# Patient Record
Sex: MALE | Race: BLACK OR AFRICAN AMERICAN | NOT HISPANIC OR LATINO | Employment: FULL TIME | URBAN - METROPOLITAN AREA
[De-identification: names, ages, dates, MRNs, and addresses within clinical notes are randomized per-mention and may not be internally consistent; named-entity substitution may affect disease eponyms.]

---

## 2020-04-29 ENCOUNTER — HOSPITAL ENCOUNTER (OUTPATIENT)
Facility: HOSPITAL | Age: 50
Discharge: HOME | End: 2020-05-01
Attending: EMERGENCY MEDICINE | Admitting: FAMILY MEDICINE
Payer: COMMERCIAL

## 2020-04-29 DIAGNOSIS — R07.9 CHEST PAIN: Primary | ICD-10-CM

## 2020-04-29 DIAGNOSIS — R73.9 HYPERGLYCEMIA: ICD-10-CM

## 2020-04-29 PROBLEM — E66.01 MORBID OBESITY: Status: ACTIVE | Noted: 2020-04-29

## 2020-04-29 PROBLEM — I10 HTN (HYPERTENSION): Status: ACTIVE | Noted: 2020-04-29

## 2020-04-29 LAB
ALBUMIN SERPL BCP-MCNC: 4.2 G/DL (ref 3.5–5.2)
ALLENS TEST: ABNORMAL
ALP SERPL-CCNC: 117 U/L (ref 55–135)
ALT SERPL W/O P-5'-P-CCNC: 29 U/L (ref 10–44)
ANION GAP SERPL CALC-SCNC: 14 MMOL/L (ref 8–16)
AST SERPL-CCNC: 15 U/L (ref 10–40)
B-OH-BUTYR BLD STRIP-SCNC: 1.3 MMOL/L (ref 0–0.5)
BASOPHILS # BLD AUTO: 0.03 K/UL (ref 0–0.2)
BASOPHILS NFR BLD: 0.5 % (ref 0–1.9)
BILIRUB SERPL-MCNC: 1 MG/DL (ref 0.1–1)
BNP SERPL-MCNC: <10 PG/ML (ref 0–99)
BUN SERPL-MCNC: 19 MG/DL (ref 6–20)
CALCIUM SERPL-MCNC: 10.4 MG/DL (ref 8.7–10.5)
CHLORIDE SERPL-SCNC: 93 MMOL/L (ref 95–110)
CHOLEST SERPL-MCNC: 217 MG/DL (ref 120–199)
CHOLEST/HDLC SERPL: 6 {RATIO} (ref 2–5)
CO2 SERPL-SCNC: 27 MMOL/L (ref 23–29)
CREAT SERPL-MCNC: 1.4 MG/DL (ref 0.5–1.4)
DELSYS: ABNORMAL
DIFFERENTIAL METHOD: NORMAL
EOSINOPHIL # BLD AUTO: 0.1 K/UL (ref 0–0.5)
EOSINOPHIL NFR BLD: 2.6 % (ref 0–8)
ERYTHROCYTE [DISTWIDTH] IN BLOOD BY AUTOMATED COUNT: 12.1 % (ref 11.5–14.5)
EST. GFR  (AFRICAN AMERICAN): >60 ML/MIN/1.73 M^2
EST. GFR  (NON AFRICAN AMERICAN): 57 ML/MIN/1.73 M^2
FIO2: 21
GLUCOSE SERPL-MCNC: 577 MG/DL (ref 70–110)
HCO3 UR-SCNC: 33.6 MMOL/L (ref 24–28)
HCT VFR BLD AUTO: 45.4 % (ref 40–54)
HDLC SERPL-MCNC: 36 MG/DL (ref 40–75)
HDLC SERPL: 16.6 % (ref 20–50)
HGB BLD-MCNC: 15.4 G/DL (ref 14–18)
IMM GRANULOCYTES # BLD AUTO: 0.01 K/UL (ref 0–0.04)
IMM GRANULOCYTES NFR BLD AUTO: 0.2 % (ref 0–0.5)
LDLC SERPL CALC-MCNC: 110.6 MG/DL (ref 63–159)
LYMPHOCYTES # BLD AUTO: 1.9 K/UL (ref 1–4.8)
LYMPHOCYTES NFR BLD: 34.8 % (ref 18–48)
MCH RBC QN AUTO: 28.6 PG (ref 27–31)
MCHC RBC AUTO-ENTMCNC: 33.9 G/DL (ref 32–36)
MCV RBC AUTO: 84 FL (ref 82–98)
MODE: ABNORMAL
MONOCYTES # BLD AUTO: 0.5 K/UL (ref 0.3–1)
MONOCYTES NFR BLD: 8.6 % (ref 4–15)
NEUTROPHILS # BLD AUTO: 2.9 K/UL (ref 1.8–7.7)
NEUTROPHILS NFR BLD: 53.3 % (ref 38–73)
NONHDLC SERPL-MCNC: 181 MG/DL
NRBC BLD-RTO: 0 /100 WBC
PCO2 BLDA: 63.8 MMHG (ref 35–45)
PH SMN: 7.33 [PH] (ref 7.35–7.45)
PLATELET # BLD AUTO: 237 K/UL (ref 150–350)
PMV BLD AUTO: 10.2 FL (ref 9.2–12.9)
PO2 BLDA: 21 MMHG (ref 40–60)
POC BE: 8 MMOL/L
POC SATURATED O2: 30 % (ref 95–100)
POCT GLUCOSE: 372 MG/DL (ref 70–110)
POCT GLUCOSE: 385 MG/DL (ref 70–110)
POCT GLUCOSE: 479 MG/DL (ref 70–110)
POTASSIUM SERPL-SCNC: 4.7 MMOL/L (ref 3.5–5.1)
PROT SERPL-MCNC: 7.6 G/DL (ref 6–8.4)
RBC # BLD AUTO: 5.39 M/UL (ref 4.6–6.2)
SAMPLE: ABNORMAL
SARS-COV-2 RDRP RESP QL NAA+PROBE: NEGATIVE
SITE: ABNORMAL
SODIUM SERPL-SCNC: 134 MMOL/L (ref 136–145)
TRIGL SERPL-MCNC: 352 MG/DL (ref 30–150)
TROPONIN I SERPL DL<=0.01 NG/ML-MCNC: 0.02 NG/ML (ref 0–0.03)
WBC # BLD AUTO: 5.49 K/UL (ref 3.9–12.7)

## 2020-04-29 PROCEDURE — 80053 COMPREHEN METABOLIC PANEL: CPT

## 2020-04-29 PROCEDURE — 83880 ASSAY OF NATRIURETIC PEPTIDE: CPT

## 2020-04-29 PROCEDURE — 84484 ASSAY OF TROPONIN QUANT: CPT | Mod: 91

## 2020-04-29 PROCEDURE — 82803 BLOOD GASES ANY COMBINATION: CPT

## 2020-04-29 PROCEDURE — 96372 THER/PROPH/DIAG INJ SC/IM: CPT | Mod: 59

## 2020-04-29 PROCEDURE — 96374 THER/PROPH/DIAG INJ IV PUSH: CPT

## 2020-04-29 PROCEDURE — G0378 HOSPITAL OBSERVATION PER HR: HCPCS

## 2020-04-29 PROCEDURE — 83036 HEMOGLOBIN GLYCOSYLATED A1C: CPT

## 2020-04-29 PROCEDURE — 63600175 PHARM REV CODE 636 W HCPCS: Performed by: NURSE PRACTITIONER

## 2020-04-29 PROCEDURE — 80061 LIPID PANEL: CPT

## 2020-04-29 PROCEDURE — 63600175 PHARM REV CODE 636 W HCPCS: Performed by: EMERGENCY MEDICINE

## 2020-04-29 PROCEDURE — 99291 CRITICAL CARE FIRST HOUR: CPT | Mod: 25

## 2020-04-29 PROCEDURE — 93010 EKG 12-LEAD: ICD-10-PCS | Mod: ,,, | Performed by: INTERNAL MEDICINE

## 2020-04-29 PROCEDURE — 99900035 HC TECH TIME PER 15 MIN (STAT)

## 2020-04-29 PROCEDURE — 25000003 PHARM REV CODE 250: Performed by: EMERGENCY MEDICINE

## 2020-04-29 PROCEDURE — 82962 GLUCOSE BLOOD TEST: CPT

## 2020-04-29 PROCEDURE — 99245 PR OFFICE CONSULTATION,LEVEL V: ICD-10-PCS | Mod: 25,,, | Performed by: INTERNAL MEDICINE

## 2020-04-29 PROCEDURE — 82010 KETONE BODYS QUAN: CPT

## 2020-04-29 PROCEDURE — 96361 HYDRATE IV INFUSION ADD-ON: CPT

## 2020-04-29 PROCEDURE — U0002 COVID-19 LAB TEST NON-CDC: HCPCS

## 2020-04-29 PROCEDURE — 99245 OFF/OP CONSLTJ NEW/EST HI 55: CPT | Mod: 25,,, | Performed by: INTERNAL MEDICINE

## 2020-04-29 PROCEDURE — 93005 ELECTROCARDIOGRAM TRACING: CPT

## 2020-04-29 PROCEDURE — 85025 COMPLETE CBC W/AUTO DIFF WBC: CPT

## 2020-04-29 PROCEDURE — 36415 COLL VENOUS BLD VENIPUNCTURE: CPT

## 2020-04-29 PROCEDURE — 93010 ELECTROCARDIOGRAM REPORT: CPT | Mod: ,,, | Performed by: INTERNAL MEDICINE

## 2020-04-29 RX ORDER — REGADENOSON 0.08 MG/ML
0.4 INJECTION, SOLUTION INTRAVENOUS ONCE
Status: COMPLETED | OUTPATIENT
Start: 2020-04-30 | End: 2020-04-30

## 2020-04-29 RX ORDER — IBUPROFEN 200 MG
16 TABLET ORAL
Status: DISCONTINUED | OUTPATIENT
Start: 2020-04-29 | End: 2020-05-01 | Stop reason: HOSPADM

## 2020-04-29 RX ORDER — LOSARTAN POTASSIUM 25 MG/1
25 TABLET ORAL DAILY
Status: DISCONTINUED | OUTPATIENT
Start: 2020-04-30 | End: 2020-05-01 | Stop reason: HOSPADM

## 2020-04-29 RX ORDER — PROMETHAZINE HYDROCHLORIDE 25 MG/1
25 TABLET ORAL EVERY 6 HOURS PRN
Status: DISCONTINUED | OUTPATIENT
Start: 2020-04-29 | End: 2020-05-01 | Stop reason: HOSPADM

## 2020-04-29 RX ORDER — INSULIN ASPART 100 [IU]/ML
1-10 INJECTION, SOLUTION INTRAVENOUS; SUBCUTANEOUS
Status: DISCONTINUED | OUTPATIENT
Start: 2020-04-29 | End: 2020-05-01 | Stop reason: HOSPADM

## 2020-04-29 RX ORDER — GLUCAGON 1 MG
1 KIT INJECTION
Status: DISCONTINUED | OUTPATIENT
Start: 2020-04-29 | End: 2020-05-01 | Stop reason: HOSPADM

## 2020-04-29 RX ORDER — OLMESARTAN MEDOXOMIL AND HYDROCHLOROTHIAZIDE 40/25 40; 25 MG/1; MG/1
1 TABLET ORAL DAILY
COMMUNITY

## 2020-04-29 RX ORDER — NAPROXEN SODIUM 220 MG/1
81 TABLET, FILM COATED ORAL DAILY
Status: DISCONTINUED | OUTPATIENT
Start: 2020-04-30 | End: 2020-05-01 | Stop reason: HOSPADM

## 2020-04-29 RX ORDER — DESLORATADINE 5 MG/1
TABLET ORAL
Status: ON HOLD | COMMUNITY
Start: 2020-04-06 | End: 2020-05-01 | Stop reason: HOSPADM

## 2020-04-29 RX ORDER — SODIUM CHLORIDE 0.9 % (FLUSH) 0.9 %
10 SYRINGE (ML) INJECTION
Status: DISCONTINUED | OUTPATIENT
Start: 2020-04-29 | End: 2020-05-01 | Stop reason: HOSPADM

## 2020-04-29 RX ORDER — IBUPROFEN 200 MG
24 TABLET ORAL
Status: DISCONTINUED | OUTPATIENT
Start: 2020-04-29 | End: 2020-05-01 | Stop reason: HOSPADM

## 2020-04-29 RX ORDER — ASPIRIN 325 MG
325 TABLET ORAL
Status: COMPLETED | OUTPATIENT
Start: 2020-04-29 | End: 2020-04-29

## 2020-04-29 RX ORDER — ACETAMINOPHEN 325 MG/1
650 TABLET ORAL EVERY 4 HOURS PRN
Status: DISCONTINUED | OUTPATIENT
Start: 2020-04-29 | End: 2020-05-01 | Stop reason: HOSPADM

## 2020-04-29 RX ADMIN — INSULIN HUMAN 8 UNITS: 100 INJECTION, SOLUTION PARENTERAL at 12:04

## 2020-04-29 RX ADMIN — INSULIN ASPART 5 UNITS: 100 INJECTION, SOLUTION INTRAVENOUS; SUBCUTANEOUS at 08:04

## 2020-04-29 RX ADMIN — SODIUM CHLORIDE 1000 ML: 0.9 INJECTION, SOLUTION INTRAVENOUS at 12:04

## 2020-04-29 RX ADMIN — ASPIRIN 325 MG: 325 TABLET ORAL at 11:04

## 2020-04-29 RX ADMIN — INSULIN ASPART 10 UNITS: 100 INJECTION, SOLUTION INTRAVENOUS; SUBCUTANEOUS at 05:04

## 2020-04-29 NOTE — ASSESSMENT & PLAN NOTE
- Place in OBS  - No known h/o cardiac dx; reports negative ST at [facility] in 2018 (records unavailable to review)  - 12 lead EKG showed NSR with left anterior fascicular block  - Troponin x 2 negative, will trend  - Given  mg in the ER, continue 81 mg daily  - Cardiology consult pending for any additional recs  - Cardiac, ADA diet, NPO after MN  - Lipid panel pending  - Tele monitoring

## 2020-04-29 NOTE — HPI
Satnam Walter is a 50 y.o. male patient with a PMHx of HTN and Morbid obesity who presented to the Emergency Department today with c/o substernal chest pain, onset 3 days PTA.  Patient states that the pain radiates to his L hand.  Symptoms are constant and moderate in severity.  No mitigating or exacerbating factors reported.  Patient denies any fever, chills, n/v/d, SOB, weakness, numbness, dizziness, headache, and all other sxs at this time.  No prior Tx reported.  No further complaints or concerns at this time.  ER workup showed:  Stable VS.  CBC unremarkable.  CMP showed BG of 577, which improved to 372 with 8 units of IV Insulin.  Beta hydroxybutyrate 1.3.  ABG showed ph 7.33, AG 14.  No known h/o DM.  A1c pending.  Troponin negative x 2.  12 lead EKG showed Normal sinus rhythm, Left anterior fascicular block.  Patient was given  mg in the ER and Hospital Medicine contacted for admission.  Cardiology consult pending given multiple risk factors.  Patient will be placed in OBS.  He is a Full Code.  His SDM is his wife Loly who can be reached at [phone].

## 2020-04-29 NOTE — CONSULTS
[facility] -   Cardiology  Consult Note    Patient Name: Satnam Walter  MRN: [id number]  Admission Date: 4/29/2020  Hospital Length of Stay: 0 days  Code Status: Full Code   Attending Provider: Toy De Los Santos MD   Consulting Provider: Jono Manning Md, MD  Primary Care Physician: Pro Levine MD  Principal Problem:Chest pain    Patient information was obtained from patient, past medical records and ER records.     Inpatient consult to Cardiology  Consult performed by: Jono Manning MD  Consult ordered by: Leigh Ann Clark NP  Reason for consult: Chest pain        Subjective:     Chief Complaint:  Chest pain     HPI:   Satnam Walter is a 50 y.o. male pt with HTN  and Morbid obesity who presented to the Emergency Department today with c/o substernal chest pain, onset 3 days PTA.   Info obtained from chart and pt. Patient states that the pain radiates to his L hand.  Symptoms are constant and moderate in severity.  No mitigating or exacerbating factors reported.  Patient denies any fever, chills, n/v/d, SOB, weakness, numbness, dizziness, headache, and all other sxs at this time.  No prior Tx reported.  No further complaints or concerns at this time.  ER workup showed:  Stable VS.  CBC unremarkable.  CMP showed BG of 577, which improved to 372 with 8 units of IV Insulin.  Beta hydroxybutyrate 1.3.  ABG showed ph 7.33, AG 14.  No known h/o DM.  A1c pending.  Troponin negative x 2.  12 lead EKG showed Normal sinus rhythm, Left anterior fascicular block.  Patient was given  mg in the ER and Hospital Medicine contacted for admission.  Cardiology consulted for chest pain.   Discussed with him regarding workup for chest pain, prior stress with ECG and no imaging. Will have pharm nuclear stress test tomorrow and ECHO. No CP currently.    Past Medical History:   Diagnosis Date    Hypertension        Past Surgical History:   Procedure Laterality Date    CHOLECYSTECTOMY         Review of  patient's allergies indicates:  No Known Allergies    No current facility-administered medications on file prior to encounter.      Current Outpatient Medications on File Prior to Encounter   Medication Sig    desloratadine (CLARINEX) 5 mg tablet     olmesartan-hydrochlorothiazide (BENICAR HCT) 40-25 mg per tablet Take 1 tablet by mouth once daily.      Family History     None        Tobacco Use    Smoking status: Unknown If Ever Smoked   Substance and Sexual Activity    Alcohol use: Not Currently    Drug use: Not Currently    Sexual activity: Yes     Partners: Female     Review of Systems   Constitution: Positive for malaise/fatigue.   HENT: Negative.    Cardiovascular: Positive for chest pain.   Respiratory: Negative.    Endocrine: Negative.    Hematologic/Lymphatic: Negative.    Musculoskeletal: Negative.    Gastrointestinal: Negative.    Genitourinary: Negative.    Neurological: Negative.    Psychiatric/Behavioral: Negative.    Allergic/Immunologic: Negative.      Objective:     Vital Signs (Most Recent):  Temp: 98.3 °F (36.8 °C) (04/29/20 1544)  Pulse: 81 (04/29/20 1544)  Resp: 18 (04/29/20 1544)  BP: 131/63 (04/29/20 1544)  SpO2: 95 % (04/29/20 1544) Vital Signs (24h Range):  Temp:  [97.8 °F (36.6 °C)-98.4 °F (36.9 °C)] 98.3 °F (36.8 °C)  Pulse:  [72-89] 81  Resp:  [15-20] 18  SpO2:  [95 %-99 %] 95 %  BP: (124-138)/(63-86) 131/63     Weight: (!) 144.3 kg (318 lb 0.2 oz)  Body mass index is 45.63 kg/m².    SpO2: 95 %  O2 Device (Oxygen Therapy): room air    No intake or output data in the 24 hours ending 04/29/20 1715    Lines/Drains/Airways     Peripheral Intravenous Line                 Peripheral IV - Single Lumen 04/29/20 1114 20 G Left Hand less than 1 day                Physical Exam   Constitutional: He is oriented to person, place, and time. He appears well-developed and well-nourished. No distress.   Obese   HENT:   Head: Normocephalic and atraumatic.   Nose: Nose normal.   Mouth/Throat:  Oropharynx is clear and moist.   Eyes: Conjunctivae and EOM are normal. No scleral icterus.   Neck: Normal range of motion. Neck supple. No JVD present. No thyromegaly present.   Cardiovascular: Normal rate, regular rhythm, S1 normal and S2 normal. Exam reveals no gallop, no S3, no S4 and no friction rub.   No murmur heard.  Pulmonary/Chest: Effort normal and breath sounds normal. No stridor. No respiratory distress. He has no wheezes. He has no rales. He exhibits no tenderness.   Abdominal: Soft. Bowel sounds are normal. He exhibits no distension and no mass. There is no tenderness. There is no rebound.   Genitourinary:   Genitourinary Comments: Deferred   Musculoskeletal: Normal range of motion. He exhibits no edema, tenderness or deformity.   Lymphadenopathy:     He has no cervical adenopathy.   Neurological: He is alert and oriented to person, place, and time. He exhibits normal muscle tone. Coordination normal.   Skin: Skin is warm and dry. No rash noted. He is not diaphoretic. No erythema. No pallor.   Psychiatric: He has a normal mood and affect. His behavior is normal. Judgment and thought content normal.   Nursing note and vitals reviewed.      Significant Labs:   All pertinent lab results from the last 24 hours have been reviewed. and   Recent Lab Results       04/29/20  1314   04/29/20  1312   04/29/20  1305   04/29/20  1216   04/29/20  1214        Albumin               Alkaline Phosphatase               Allens Test N/A             ALT               Anion Gap               AST               Baso #               Basophil%               Beta-Hydroxybutyrate         1.3     BILIRUBIN TOTAL               BNP               Site Other             BUN, Bld               Calcium               Chloride               CO2               Creatinine               DelSys Room Air             Differential Method               eGFR if                eGFR if non                Eos #                Eosinophil%               FiO2 21             Glucose               Gran # (ANC)               Gran%               Hematocrit               Hemoglobin               Immature Grans (Abs)               Immature Granulocytes               Lymph #               Lymph%               MCH               MCHC               MCV               Mode SPONT             Mono #               Mono%               MPV               nRBC               Platelets               POC BE 8             POC HCO3 33.6             POC PCO2 63.8             POC PH 7.330             POC PO2 21             POC SATURATED O2 30             POCT Glucose     372 479       Potassium               PROTEIN TOTAL               RBC               RDW               Sample VENOUS             SARS-CoV-2 RNA, Amplification, Qual               Sodium               Troponin I   0.017  Comment:  The reference interval for Troponin I represents the 99th percentile   cutoff   for our facility and is consistent with 3rd generation assay   performance.             WBC                                04/29/20  1114   04/29/20  1113        Albumin   4.2     Alkaline Phosphatase   117     Allens Test         ALT   29     Anion Gap   14     AST   15     Baso #   0.03     Basophil%   0.5     Beta-Hydroxybutyrate         BILIRUBIN TOTAL   1.0  Comment:  For infants and newborns, interpretation of results should be based  on gestational age, weight and in agreement with clinical  observations.  Premature Infant recommended reference ranges:  Up to 24 hours.............<8.0 mg/dL  Up to 48 hours............<12.0 mg/dL  3-5 days..................<15.0 mg/dL  6-29 days.................<15.0 mg/dL       BNP   <10  Comment:  Values of less than 100 pg/ml are consistent with non-CHF populations.     Site         BUN, Bld   19     Calcium   10.4     Chloride   93     CO2   27     Creatinine   1.4     DelSys         Differential Method   Automated     eGFR if     >60     eGFR if non    57  Comment:  Calculation used to obtain the estimated glomerular filtration  rate (eGFR) is the CKD-EPI equation.        Eos #   0.1     Eosinophil%   2.6     FiO2         Glucose   577  Comment:  GLUCOSE  critical result(s) called and verbal readback obtained from   CHETNA RIVERA RN by CYRIL 04/29/2020 11:49       Gran # (ANC)   2.9     Gran%   53.3     Hematocrit   45.4     Hemoglobin   15.4     Immature Grans (Abs)   0.01  Comment:  Mild elevation in immature granulocytes is non specific and   can be seen in a variety of conditions including stress response,   acute inflammation, trauma and pregnancy. Correlation with other   laboratory and clinical findings is essential.       Immature Granulocytes   0.2     Lymph #   1.9     Lymph%   34.8     MCH   28.6     MCHC   33.9     MCV   84     Mode         Mono #   0.5     Mono%   8.6     MPV   10.2     nRBC   0     Platelets   237     POC BE         POC HCO3         POC PCO2         POC PH         POC PO2         POC SATURATED O2         POCT Glucose         Potassium   4.7     PROTEIN TOTAL   7.6     RBC   5.39     RDW   12.1     Sample         SARS-CoV-2 RNA, Amplification, Qual Negative  Comment:  This test utilizes isothermal nucleic acid amplification   technology to detect the SARS-CoV-2 RdRp nucleic acid segment.   The analytical sensitivity (limit of detection) is 125 genome   equivalents/mL.   A POSITIVE result implies infection with the SARS-CoV-2 virus;  the patient is presumed to be contagious.    A NEGATIVE result means that SARS-CoV-2 nucleic acids are not  present above the limit of detection. It does not rule out the   possibility of COVID-19 and should not be the sole basis for   treatment decisions. If COVID-19 is strongly suspected based on  clinical and exposure history, re-testing should be considered.   This test is only for use under the Food and Drug   Administration s Emergency Use Authorization (EUA).    Commercial kits are provided by [employer].   Performance characteristics of the EUA have been independently  verified by [facility] Department of  Pathology and Laboratory Medicine.   _________________________________________________________________  The ID NOW COVID-19 Letter of Authorization, along with the   authorized Fact Sheet for Healthcare Providers, the authorized Fact  Sheet for Patients, and authorized labeling are available on the FDA   website:  www.fda.gov/MedicalDevices/Safety/EmergencySituations/izk331545.htm         Sodium   134     Troponin I   0.023  Comment:  The reference interval for Troponin I represents the 99th percentile   cutoff   for our facility and is consistent with 3rd generation assay   performance.       WBC   5.49           Significant Imaging: X-Ray: CXR: X-Ray Chest 1 View (CXR): No results found for this visit on 04/29/20.    Assessment and Plan:     * Chest pain  R/o ACS  Cont enzymes, ECGs  Pharm Nuclear stress test tomorrow   If neg discussed other causes - LALY, GI/MSK   NPO past midnight    Morbid obesity  Needs weight loss with diet/exercise  May be candidate for bariatric surgery     HTN (hypertension)  Cont meds    Hyperglycemia  Cont tx per primary team        VTE Risk Mitigation (From admission, onward)         Ordered     IP VTE HIGH RISK PATIENT  Once      04/29/20 1547     Place sequential compression device  Until discontinued      04/29/20 1547                Thank you for your consult. I will follow-up with patient. Please contact us if you have any additional questions.    Jono Manning Md, MD  Cardiology   [facility]

## 2020-04-29 NOTE — ASSESSMENT & PLAN NOTE
R/o ACS  Cont enzymes, ECGs  Pharm Nuclear stress test tomorrow   If neg discussed other causes - LALY, GI/MSK   NPO past midnight

## 2020-04-29 NOTE — ED PROVIDER NOTES
SCRIBE #1 NOTE: I, Matthew Melecio, am scribing for, and in the presence of, Ac Estrada MD. I have scribed the entire note.      History      Chief Complaint   Patient presents with    Chest Pain     c/o chest pain, L arm numbness, and blurry vision for the last 3 days       Review of patient's allergies indicates:  No Known Allergies     HPI   HPI    4/29/2020, 11:54 AM   History obtained from the patient      History of Present Illness: Satnam Walter is a 50 y.o. male patient with a PMHx of HTN who presents to the Emergency Department for substernal chest pain, onset 3 days PTA. Pt states that the pain radiates to his L hand. Symptoms are constant and moderate in severity. No mitigating or exacerbating factors reported. Patient denies any fever, chills, n/v/d, SOB, weakness, numbness, dizziness, headache, and all other sxs at this time. No prior Tx reported. No further complaints or concerns at this time.     Arrival mode: Personal vehicle    PCP: Pro Levine MD       Past Medical History:  Past Medical History:   Diagnosis Date    Hypertension        Past Surgical History:  Past Surgical History:   Procedure Laterality Date    CHOLECYSTECTOMY           Family History:  History reviewed. No pertinent family history.    Social History:  Social History     Tobacco Use    Smoking status: Unknown If Ever Smoked   Substance and Sexual Activity    Alcohol use: Not Currently    Drug use: Not Currently    Sexual activity: Yes     Partners: Female       ROS   Review of Systems   Constitutional: Negative for chills, diaphoresis, fatigue and fever.   HENT: Negative for sore throat.    Respiratory: Negative for shortness of breath.    Cardiovascular: Positive for chest pain (substernal).   Gastrointestinal: Negative for diarrhea, nausea and vomiting.   Genitourinary: Negative for dysuria.   Musculoskeletal: Positive for myalgias (L hand). Negative for back pain.   Skin: Negative for rash.    Neurological: Negative for dizziness, seizures, weakness, light-headedness, numbness and headaches.   Hematological: Does not bruise/bleed easily.   All other systems reviewed and are negative.    Physical Exam      Initial Vitals [04/29/20 1052]   BP Pulse Resp Temp SpO2   138/83 82 18 98 °F (36.7 °C) 97 %      MAP       --          Physical Exam  Nursing Notes and Vital Signs Reviewed.  Constitutional: Patient is in no acute distress. Well-developed and well-nourished.  Head: Atraumatic. Normocephalic.  Eyes: PERRL. EOM intact. Conjunctivae are not pale. No scleral icterus.  ENT: Mucous membranes are moist. Oropharynx is clear and symmetric.    Neck: Supple. Full ROM. No lymphadenopathy.  Cardiovascular: Regular rate. Regular rhythm. No murmurs, rubs, or gallops. Distal pulses are 2+ and symmetric.  Pulmonary/Chest: No respiratory distress. Clear to auscultation bilaterally. No wheezing or rales.  Abdominal: Soft and non-distended.  There is no tenderness.  No rebound, guarding, or rigidity.   Musculoskeletal: Moves all extremities. No obvious deformities. No edema.  Skin: Warm and dry.  Neurological:  Alert, awake, and appropriate.  Normal speech.  No acute focal neurological deficits are appreciated.  Psychiatric: Normal affect. Good eye contact. Appropriate in content.    ED Course    Critical Care  Date/Time: 4/29/2020 2:02 PM  Performed by: Ac Estrada MD  Authorized by: Ac Estrada MD   Direct patient critical care time: 25 minutes  Additional history critical care time: 5 minutes  Ordering / reviewing critical care time: 5 minutes  Documentation critical care time: 5 minutes  Consulting other physicians critical care time: 5 minutes  Total critical care time (exclusive of procedural time) : 45 minutes  Critical care time was exclusive of separately billable procedures and treating other patients and teaching time.  Critical care was necessary to treat or prevent imminent or  "life-threatening deterioration of the following conditions: Hyperglycemia.  Critical care was time spent personally by me on the following activities: blood draw for specimens, development of treatment plan with patient or surrogate, discussions with consultants, interpretation of cardiac output measurements, evaluation of patient's response to treatment, examination of patient, obtaining history from patient or surrogate, ordering and performing treatments and interventions, ordering and review of laboratory studies, ordering and review of radiographic studies, pulse oximetry and re-evaluation of patient's condition.        ED Vital Signs:  Vitals:    04/29/20 1052 04/29/20 1113 04/29/20 1143 04/29/20 1233   BP: 138/83   125/68   Pulse: 82 89  86   Resp: 18   20   Temp: 98 °F (36.7 °C)   98.2 °F (36.8 °C)   TempSrc: Oral   Oral   SpO2: 97%   98%   Weight: (!) 144.3 kg (318 lb 0.2 oz)      Height:   5' 10" (1.778 m)     04/29/20 1301   BP: 128/74   Pulse: 83   Resp: 18   Temp:    TempSrc:    SpO2: 95%   Weight:    Height:        Abnormal Lab Results:  Labs Reviewed   COMPREHENSIVE METABOLIC PANEL - Abnormal; Notable for the following components:       Result Value    Sodium 134 (*)     Chloride 93 (*)     Glucose 577 (*)     eGFR if non  57 (*)     All other components within normal limits    Narrative:      GLUCOSE  critical result(s) called and verbal readback obtained from   CHETNA RIVERA RN by CYRIL 04/29/2020 11:49   BETA - HYDROXYBUTYRATE, SERUM - Abnormal; Notable for the following components:    Beta-Hydroxybutyrate 1.3 (*)     All other components within normal limits   POCT GLUCOSE - Abnormal; Notable for the following components:    POCT Glucose 479 (*)     All other components within normal limits   POCT GLUCOSE - Abnormal; Notable for the following components:    POCT Glucose 372 (*)     All other components within normal limits   ISTAT PROCEDURE - Abnormal; Notable for the following " components:    POC PH 7.330 (*)     POC PCO2 63.8 (*)     POC PO2 21 (*)     POC HCO3 33.6 (*)     POC SATURATED O2 30 (*)     All other components within normal limits   CBC W/ AUTO DIFFERENTIAL   TROPONIN I   B-TYPE NATRIURETIC PEPTIDE   SARS-COV-2 RNA AMPLIFICATION, QUAL   TROPONIN I   POCT GLUCOSE MONITORING CONTINUOUS        All Lab Results:  Results for orders placed or performed during the hospital encounter of 04/29/20   CBC auto differential   Result Value Ref Range    WBC 5.49 3.90 - 12.70 K/uL    RBC 5.39 4.60 - 6.20 M/uL    Hemoglobin 15.4 14.0 - 18.0 g/dL    Hematocrit 45.4 40.0 - 54.0 %    Mean Corpuscular Volume 84 82 - 98 fL    Mean Corpuscular Hemoglobin 28.6 27.0 - 31.0 pg    Mean Corpuscular Hemoglobin Conc 33.9 32.0 - 36.0 g/dL    RDW 12.1 11.5 - 14.5 %    Platelets 237 150 - 350 K/uL    MPV 10.2 9.2 - 12.9 fL    Immature Granulocytes 0.2 0.0 - 0.5 %    Gran # (ANC) 2.9 1.8 - 7.7 K/uL    Immature Grans (Abs) 0.01 0.00 - 0.04 K/uL    Lymph # 1.9 1.0 - 4.8 K/uL    Mono # 0.5 0.3 - 1.0 K/uL    Eos # 0.1 0.0 - 0.5 K/uL    Baso # 0.03 0.00 - 0.20 K/uL    nRBC 0 0 /100 WBC    Gran% 53.3 38.0 - 73.0 %    Lymph% 34.8 18.0 - 48.0 %    Mono% 8.6 4.0 - 15.0 %    Eosinophil% 2.6 0.0 - 8.0 %    Basophil% 0.5 0.0 - 1.9 %    Differential Method Automated    Comprehensive metabolic panel   Result Value Ref Range    Sodium 134 (L) 136 - 145 mmol/L    Potassium 4.7 3.5 - 5.1 mmol/L    Chloride 93 (L) 95 - 110 mmol/L    CO2 27 23 - 29 mmol/L    Glucose 577 (HH) 70 - 110 mg/dL    BUN, Bld 19 6 - 20 mg/dL    Creatinine 1.4 0.5 - 1.4 mg/dL    Calcium 10.4 8.7 - 10.5 mg/dL    Total Protein 7.6 6.0 - 8.4 g/dL    Albumin 4.2 3.5 - 5.2 g/dL    Total Bilirubin 1.0 0.1 - 1.0 mg/dL    Alkaline Phosphatase 117 55 - 135 U/L    AST 15 10 - 40 U/L    ALT 29 10 - 44 U/L    Anion Gap 14 8 - 16 mmol/L    eGFR if African American >60 >60 mL/min/1.73 m^2    eGFR if non African American 57 (A) >60 mL/min/1.73 m^2   Troponin I #1    Result Value Ref Range    Troponin I 0.023 0.000 - 0.026 ng/mL   B-Type natriuretic peptide (BNP)   Result Value Ref Range    BNP <10 0 - 99 pg/mL   COVID-19 Routine Screening   Result Value Ref Range    SARS-CoV-2 RNA, Amplification, Qual Negative Negative   Beta - Hydroxybutyrate, Serum   Result Value Ref Range    Beta-Hydroxybutyrate 1.3 (H) 0.0 - 0.5 mmol/L   POCT glucose   Result Value Ref Range    POCT Glucose 479 (HH) 70 - 110 mg/dL   POCT glucose   Result Value Ref Range    POCT Glucose 372 (H) 70 - 110 mg/dL   ISTAT PROCEDURE   Result Value Ref Range    POC PH 7.330 (L) 7.35 - 7.45    POC PCO2 63.8 (H) 35 - 45 mmHg    POC PO2 21 (LL) 40 - 60 mmHg    POC HCO3 33.6 (H) 24 - 28 mmol/L    POC BE 8 -2 to 2 mmol/L    POC SATURATED O2 30 (L) 95 - 100 %    Sample VENOUS     Site Other     Allens Test N/A     DelSys Room Air     Mode SPONT     FiO2 21      Imaging Results:  Imaging Results          X-Ray Chest AP Portable (Final result)  Result time 04/29/20 11:25:38    Final result by Erik Delgado MD (04/29/20 11:25:38)                 Impression:      No acute findings.      Electronically signed by: Erik Delgado MD  Date:    04/29/2020  Time:    11:25             Narrative:    EXAMINATION:  XR CHEST AP PORTABLE    CLINICAL HISTORY:  Acute chest pain, Chest Pain;    COMPARISON:  None    FINDINGS:  Heart size is normal. The lung fields are clear. No acute cardiopulmonary infiltrate.                               The EKG was ordered, reviewed, and independently interpreted by the ED provider.  Interpretation time: 11:00  Rate: 91 BPM  Rhythm: normal sinus rhythm  Interpretation: Left anterior fascicular block. No STEMI.           The Emergency Provider reviewed the vital signs and test results, which are outlined above.    ED Discussion     2:00 PM: Discussed case with Koby Valdez NP (Hospital Medicine). Dr. De Los Santos agrees with current care and management of pt and accepts admission.   Admitting  Service: Hospital Medicine  Admitting Physician: Dr. De Los Santos  Admit to: Obs Med Tele    2:01 PM: Re-evaluated pt. I have discussed test results, shared treatment plan, and the need for admission with patient at bedside. Pt expresses understanding at this time and agree with all information. All questions answered. Pt has no further questions or concerns at this time. Pt is ready for admit.         ED Medication(s):  Medications   aspirin tablet 325 mg (325 mg Oral Given 4/29/20 1147)   sodium chloride 0.9% bolus 1,000 mL (0 mLs Intravenous Stopped 4/29/20 1312)   insulin regular injection 8 Units (8 Units Intravenous Given 4/29/20 1224)     New Prescriptions    No medications on file           Medical Decision Making    Medical Decision Making:   Clinical Tests:   Lab Tests: Ordered and Reviewed  Radiological Study: Ordered and Reviewed  Medical Tests: Ordered and Reviewed           Scribe Attestation:   Scribe #1: I performed the above scribed service and the documentation accurately describes the services I performed. I attest to the accuracy of the note.    Attending:   Physician Attestation Statement for Scribe #1: I, Ac Estrada MD, personally performed the services described in this documentation, as scribed by Matthew Majano, in my presence, and it is both accurate and complete.          Clinical Impression       ICD-10-CM ICD-9-CM   1. Chest pain R07.9 786.50   2. Hyperglycemia R73.9 790.29       Disposition:   Disposition: Placed in Observation  Condition: Fair         Ac Estrada MD  05/01/20 3078

## 2020-04-29 NOTE — SUBJECTIVE & OBJECTIVE
Past Medical History:   Diagnosis Date    Hypertension        Past Surgical History:   Procedure Laterality Date    CHOLECYSTECTOMY         Review of patient's allergies indicates:  No Known Allergies    No current facility-administered medications on file prior to encounter.      Current Outpatient Medications on File Prior to Encounter   Medication Sig    desloratadine (CLARINEX) 5 mg tablet     olmesartan-hydrochlorothiazide (BENICAR HCT) 40-25 mg per tablet Take 1 tablet by mouth once daily.      Family History     None        Tobacco Use    Smoking status: Unknown If Ever Smoked   Substance and Sexual Activity    Alcohol use: Not Currently    Drug use: Not Currently    Sexual activity: Yes     Partners: Female     Review of Systems   Constitution: Positive for malaise/fatigue.   HENT: Negative.    Cardiovascular: Positive for chest pain.   Respiratory: Negative.    Endocrine: Negative.    Hematologic/Lymphatic: Negative.    Musculoskeletal: Negative.    Gastrointestinal: Negative.    Genitourinary: Negative.    Neurological: Negative.    Psychiatric/Behavioral: Negative.    Allergic/Immunologic: Negative.      Objective:     Vital Signs (Most Recent):  Temp: 98.3 °F (36.8 °C) (04/29/20 1544)  Pulse: 81 (04/29/20 1544)  Resp: 18 (04/29/20 1544)  BP: 131/63 (04/29/20 1544)  SpO2: 95 % (04/29/20 1544) Vital Signs (24h Range):  Temp:  [97.8 °F (36.6 °C)-98.4 °F (36.9 °C)] 98.3 °F (36.8 °C)  Pulse:  [72-89] 81  Resp:  [15-20] 18  SpO2:  [95 %-99 %] 95 %  BP: (124-138)/(63-86) 131/63     Weight: (!) 144.3 kg (318 lb 0.2 oz)  Body mass index is 45.63 kg/m².    SpO2: 95 %  O2 Device (Oxygen Therapy): room air    No intake or output data in the 24 hours ending 04/29/20 1715    Lines/Drains/Airways     Peripheral Intravenous Line                 Peripheral IV - Single Lumen 04/29/20 1114 20 G Left Hand less than 1 day                Physical Exam   Constitutional: He is oriented to person, place, and time. He  appears well-developed and well-nourished. No distress.   Obese   HENT:   Head: Normocephalic and atraumatic.   Nose: Nose normal.   Mouth/Throat: Oropharynx is clear and moist.   Eyes: Conjunctivae and EOM are normal. No scleral icterus.   Neck: Normal range of motion. Neck supple. No JVD present. No thyromegaly present.   Cardiovascular: Normal rate, regular rhythm, S1 normal and S2 normal. Exam reveals no gallop, no S3, no S4 and no friction rub.   No murmur heard.  Pulmonary/Chest: Effort normal and breath sounds normal. No stridor. No respiratory distress. He has no wheezes. He has no rales. He exhibits no tenderness.   Abdominal: Soft. Bowel sounds are normal. He exhibits no distension and no mass. There is no tenderness. There is no rebound.   Genitourinary:   Genitourinary Comments: Deferred   Musculoskeletal: Normal range of motion. He exhibits no edema, tenderness or deformity.   Lymphadenopathy:     He has no cervical adenopathy.   Neurological: He is alert and oriented to person, place, and time. He exhibits normal muscle tone. Coordination normal.   Skin: Skin is warm and dry. No rash noted. He is not diaphoretic. No erythema. No pallor.   Psychiatric: He has a normal mood and affect. His behavior is normal. Judgment and thought content normal.   Nursing note and vitals reviewed.      Significant Labs:   All pertinent lab results from the last 24 hours have been reviewed. and   Recent Lab Results       04/29/20  1314   04/29/20  1312   04/29/20  1305   04/29/20  1216   04/29/20  1214        Albumin               Alkaline Phosphatase               Allens Test N/A             ALT               Anion Gap               AST               Baso #               Basophil%               Beta-Hydroxybutyrate         1.3     BILIRUBIN TOTAL               BNP               Site Other             BUN, Bld               Calcium               Chloride               CO2               Creatinine               [facility]  Air             Differential Method               eGFR if                eGFR if non                Eos #               Eosinophil%               FiO2 21             Glucose               Gran # (ANC)               Gran%               Hematocrit               Hemoglobin               Immature Grans (Abs)               Immature Granulocytes               Lymph #               Lymph%               MCH               MCHC               MCV               Mode SPONT             Mono #               Mono%               MPV               nRBC               Platelets               POC BE 8             POC HCO3 33.6             POC PCO2 63.8             POC PH 7.330             POC PO2 21             POC SATURATED O2 30             POCT Glucose     372 479       Potassium               PROTEIN TOTAL               RBC               RDW               Sample VENOUS             SARS-CoV-2 RNA, Amplification, Qual               Sodium               Troponin I   0.017  Comment:  The reference interval for Troponin I represents the 99th percentile   cutoff   for our facility and is consistent with 3rd generation assay   performance.             WBC                                04/29/20  1114   04/29/20  1113        Albumin   4.2     Alkaline Phosphatase   117     Allens Test         ALT   29     Anion Gap   14     AST   15     Baso #   0.03     Basophil%   0.5     Beta-Hydroxybutyrate         BILIRUBIN TOTAL   1.0  Comment:  For infants and newborns, interpretation of results should be based  on gestational age, weight and in agreement with clinical  observations.  Premature Infant recommended reference ranges:  Up to 24 hours.............<8.0 mg/dL  Up to 48 hours............<12.0 mg/dL  3-5 days..................<15.0 mg/dL  6-29 days.................<15.0 mg/dL       BNP   <10  Comment:  Values of less than 100 pg/ml are consistent with non-CHF populations.     Site         BUN, Bld   19      Calcium   10.4     Chloride   93     CO2   27     Creatinine   1.4     DelSys         Differential Method   Automated     eGFR if    >60     eGFR if non    57  Comment:  Calculation used to obtain the estimated glomerular filtration  rate (eGFR) is the CKD-EPI equation.        Eos #   0.1     Eosinophil%   2.6     FiO2         Glucose   577  Comment:  GLUCOSE  critical result(s) called and verbal readback obtained from   CHETNA RIVERA RN by CYRIL 04/29/2020 11:49       Gran # (ANC)   2.9     Gran%   53.3     Hematocrit   45.4     Hemoglobin   15.4     Immature Grans (Abs)   0.01  Comment:  Mild elevation in immature granulocytes is non specific and   can be seen in a variety of conditions including stress response,   acute inflammation, trauma and pregnancy. Correlation with other   laboratory and clinical findings is essential.       Immature Granulocytes   0.2     Lymph #   1.9     Lymph%   34.8     MCH   28.6     MCHC   33.9     MCV   84     Mode         Mono #   0.5     Mono%   8.6     MPV   10.2     nRBC   0     Platelets   237     POC BE         POC HCO3         POC PCO2         POC PH         POC PO2         POC SATURATED O2         POCT Glucose         Potassium   4.7     PROTEIN TOTAL   7.6     RBC   5.39     RDW   12.1     Sample         SARS-CoV-2 RNA, Amplification, Qual Negative  Comment:  This test utilizes isothermal nucleic acid amplification   technology to detect the SARS-CoV-2 RdRp nucleic acid segment.   The analytical sensitivity (limit of detection) is 125 genome   equivalents/mL.   A POSITIVE result implies infection with the SARS-CoV-2 virus;  the patient is presumed to be contagious.    A NEGATIVE result means that SARS-CoV-2 nucleic acids are not  present above the limit of detection. It does not rule out the   possibility of COVID-19 and should not be the sole basis for   treatment decisions. If COVID-19 is strongly suspected based on  clinical and exposure  history, re-testing should be considered.   This test is only for use under the Food and Drug   Administration s Emergency Use Authorization (EUA).   Commercial kits are provided by [employer].   Performance characteristics of the EUA have been independently  verified by [facility] Department of  Pathology and Laboratory Medicine.   _________________________________________________________________  The ID NOW COVID-19 Letter of Authorization, along with the   authorized Fact Sheet for Healthcare Providers, the authorized Fact  Sheet for Patients, and authorized labeling are available on the FDA   website:  www.fda.gov/MedicalDevices/Safety/EmergencySituations/ukx521757.htm         Sodium   134     Troponin I   0.023  Comment:  The reference interval for Troponin I represents the 99th percentile   cutoff   for our facility and is consistent with 3rd generation assay   performance.       WBC   5.49           Significant Imaging: X-Ray: CXR: X-Ray Chest 1 View (CXR): No results found for this visit on 04/29/20.

## 2020-04-29 NOTE — ASSESSMENT & PLAN NOTE
- No known h/o DM; beta hydroxybutyrate 1.3, AG normal; no prior A1c available for review  - A1c pending  - Given 8 units of IV insulin in the Er with improvement in BG  - Accu checks ACHS with moderate dose SSI PRN  - ADA diet  - Monitor

## 2020-04-29 NOTE — PLAN OF CARE
SW spoke with patient by phone to assess for discharge planning.  Patient sounded alert and oriented.  Patient reported utilizing a CPAP machine, but denied the use of any medical/respiratory assistive equipment, home health services, and all other community resources at this time.  Patient identified his wife and his mother as huis help at home and stated that he manages his own healthcare.  Patient denied the need for any assistive equipment, home health services, and all other community resources at this time.  Patient anticipates discharging with no needs.  SW discussed information on advanced directives, information on pharmacy bedside delivery, and discharge planning begins on admission with contact information for any needs/questions.     D/C Plan:  Home  PCP:  Dr. Pro Levine  Preferred Pharmacy:  [facility]  Discharge transportation:  family  [facility]:  Pharmacy Bedside Delivery: Yes       04/29/20 6621   Discharge Assessment   Assessment Type Discharge Planning Assessment   Confirmed/corrected address and phone number on facesheet? Yes   Assessment information obtained from? Patient   Expected Length of Stay (days)   (TBD)   Communicated expected length of stay with patient/caregiver yes   Prior to hospitilization cognitive status: Alert/Oriented   Prior to hospitalization functional status: Independent;Assistive Equipment   Current cognitive status: Alert/Oriented   Current Functional Status: Independent;Assistive Equipment   Facility Arrived From: Home   Lives With spouse;parent(s)   Able to Return to Prior Arrangements yes   Is patient able to care for self after discharge? Yes   Who are your caregiver(s) and their phone number(s)? Deana Corea (mother) [phone] and Yokasta Walter (spouse) [phone]   Readmission Within the Last 30 Days no previous admission in last 30 days   Patient currently being followed by outpatient case management? No   Patient currently receives any other  outside agency services? No   Do you have any problems affording any of your prescribed medications? No   Is the patient taking medications as prescribed? yes   Does the patient have transportation home? Yes   Transportation Anticipated family or friend will provide   Dialysis Name and Scheduled days N/A   Does the patient receive services at the [facility]? No   Discharge Plan A Home with family   Discharge Plan B Home with family   DME Needed Upon Discharge  none   Patient/Family in Agreement with Plan yes

## 2020-04-29 NOTE — H&P
[facility] Hospital Medicine  History & Physical    Patient Name: Satnam Walter  MRN: [id number]  Admission Date: 4/29/2020  Attending Physician: Toy De Los Santos MD   Primary Care Provider: Pro Levine MD         Patient information was obtained from patient, past medical records and ER records.     Subjective:     Principal Problem:Chest pain    Chief Complaint:   Chief Complaint   Patient presents with    Chest Pain     c/o chest pain, L arm numbness, and blurry vision for the last 3 days        HPI: Satanm Walter is a 50 y.o. male patient with a PMHx of HTN  and Morbid obesity who presented to the Emergency Department today with c/o substernal chest pain, onset 3 days PTA.  Patient states that the pain radiates to his L hand.  Symptoms are constant and moderate in severity.  No mitigating or exacerbating factors reported.  Patient denies any fever, chills, n/v/d, SOB, weakness, numbness, dizziness, headache, and all other sxs at this time.  No prior Tx reported.  No further complaints or concerns at this time.  ER workup showed:  Stable VS.  CBC unremarkable.  CMP showed BG of 577, which improved to 372 with 8 units of IV Insulin.  Beta hydroxybutyrate 1.3.  ABG showed ph 7.33, AG 14.  No known h/o DM.  A1c pending.  Troponin negative x 2.  12 lead EKG showed Normal sinus rhythm, Left anterior fascicular block.  Patient was given  mg in the ER and Hospital Medicine contacted for admission.  Cardiology consult pending given multiple risk factors.  Patient will be placed in OBS.  He is a Full Code.  His SDM is his wife Loly who can be reached at [phone].      Past Medical History:   Diagnosis Date    Hypertension        Past Surgical History:   Procedure Laterality Date    CHOLECYSTECTOMY         Review of patient's allergies indicates:  No Known Allergies    No current facility-administered medications on file prior to encounter.      Current Outpatient Medications  on File Prior to Encounter   Medication Sig    desloratadine (CLARINEX) 5 mg tablet     olmesartan-hydrochlorothiazide (BENICAR HCT) 40-25 mg per tablet Take 1 tablet by mouth once daily.      Family History     None        Tobacco Use    Smoking status: Unknown If Ever Smoked   Substance and Sexual Activity    Alcohol use: Not Currently    Drug use: Not Currently    Sexual activity: Yes     Partners: Female     Review of Systems   Constitutional: Negative for chills, diaphoresis, fatigue and fever.   HENT: Negative for hearing loss, mouth sores, sore throat, tinnitus and trouble swallowing.    Eyes: Negative for pain, discharge and redness.   Respiratory: Positive for chest tightness. Negative for apnea, cough, choking, shortness of breath, wheezing and stridor.         C/O chest pain/tightness PTA that has now resolved.   Cardiovascular: Negative for chest pain, palpitations and leg swelling.   Gastrointestinal: Negative for abdominal distention, abdominal pain, blood in stool, constipation, diarrhea, nausea, rectal pain and vomiting.   Endocrine: Positive for polydipsia, polyphagia and polyuria. Negative for cold intolerance and heat intolerance.   Genitourinary: Negative for difficulty urinating, dysuria, flank pain, frequency, hematuria and urgency.   Musculoskeletal: Negative for arthralgias, back pain, gait problem, joint swelling, neck pain and neck stiffness.   Skin: Negative for color change, rash and wound.   Allergic/Immunologic: Negative for food allergies.   Neurological: Negative for dizziness, tremors, seizures, syncope, speech difficulty, light-headedness and headaches.   Hematological: Negative for adenopathy. Does not bruise/bleed easily.   Psychiatric/Behavioral: Negative for agitation and confusion. The patient is not nervous/anxious.    All other systems reviewed and are negative.    Objective:     Vital Signs (Most Recent):  Temp: 98.3 °F (36.8 °C) (04/29/20 1544)  Pulse: 81 (04/29/20  1544)  Resp: 18 (04/29/20 1544)  BP: 131/63 (04/29/20 1544)  SpO2: 95 % (04/29/20 1544) Vital Signs (24h Range):  Temp:  [97.8 °F (36.6 °C)-98.4 °F (36.9 °C)] 98.3 °F (36.8 °C)  Pulse:  [72-89] 81  Resp:  [15-20] 18  SpO2:  [95 %-99 %] 95 %  BP: (124-138)/(63-86) 131/63     Weight: (!) 144.3 kg (318 lb 0.2 oz)  Body mass index is 45.63 kg/m².    Physical Exam   Constitutional: He is oriented to person, place, and time. He appears well-developed and well-nourished. No distress.   Morbidly obese AAM resting comfortably in bed in NAD.   HENT:   Head: Normocephalic and atraumatic.   Mouth/Throat: Oropharynx is clear and moist.   Eyes: Pupils are equal, round, and reactive to light. Conjunctivae and EOM are normal.   Neck: Normal range of motion. Neck supple. No JVD present.   Cardiovascular: Normal rate, regular rhythm, normal heart sounds and intact distal pulses. Exam reveals no gallop and no friction rub.   No murmur heard.  Pulmonary/Chest: Effort normal and breath sounds normal. No stridor. No respiratory distress. He has no wheezes. He has no rales. He exhibits no tenderness.   Abdominal: Soft. Bowel sounds are normal. He exhibits no distension and no mass. There is no tenderness. There is no rebound and no guarding.   Musculoskeletal: Normal range of motion. He exhibits no edema or tenderness.   Neurological: He is alert and oriented to person, place, and time. No cranial nerve deficit.   Skin: Skin is warm and dry. No rash noted. He is not diaphoretic. No erythema.   Psychiatric: He has a normal mood and affect. His behavior is normal. Judgment and thought content normal.   Nursing note and vitals reviewed.        CRANIAL NERVES     CN III, IV, VI   Pupils are equal, round, and reactive to light.  Extraocular motions are normal.        Significant Labs:   ABGs:   Recent Labs   Lab 04/29/20  1314   PH 7.330*   PCO2 63.8*   HCO3 33.6*   POCSATURATED 30*   BE 8     CBC:   Recent Labs   Lab 04/29/20  1113   WBC 5.49    HGB 15.4   HCT 45.4        CMP:   Recent Labs   Lab 04/29/20  1113   *   K 4.7   CL 93*   CO2 27   *   BUN 19   CREATININE 1.4   CALCIUM 10.4   PROT 7.6   ALBUMIN 4.2   BILITOT 1.0   ALKPHOS 117   AST 15   ALT 29   ANIONGAP 14   EGFRNONAA 57*     Cardiac Markers:   Recent Labs   Lab 04/29/20  1113   BNP <10     Troponin:   Recent Labs   Lab 04/29/20  1113 04/29/20  1312   TROPONINI 0.023 0.017       Significant Imaging: I have reviewed all pertinent imaging results/findings within the past 24 hours.    Assessment/Plan:     * Chest pain  - Place in OBS  - No known h/o cardiac dx; reports negative ST at [facility] in 2018 (records unavailable to review)  - 12 lead EKG showed NSR with left anterior fascicular block  - Troponin x 2 negative, will trend  - Given  mg in the ER, continue 81 mg daily  - Cardiology consult pending for any additional recs  - Cardiac, ADA diet, NPO after MN  - Lipid panel pending  - Tele monitoring      Hyperglycemia  - No known h/o DM; beta hydroxybutyrate 1.3, AG normal; no prior A1c available for review  - A1c pending  - Given 8 units of IV insulin in the Er with improvement in BG  - Accu checks ACHS with moderate dose SSI PRN  - ADA diet  - Monitor      HTN (hypertension)  - BP well controlled  - Will substitute Losartan for home Benicar while here; resume home meds at DC  - Monitor and adjust meds pending BP trends      Morbid obesity  - ADA, Cardiac diet  -  on the need for weight loss and diet control      VTE Risk Mitigation (From admission, onward)         Ordered     IP VTE HIGH RISK PATIENT  Once      04/29/20 1547     Place sequential compression device  Until discontinued      04/29/20 1547                   Leigh Ann Clark, TASHA, ACNP-BC  Department of Hospital Medicine   [facility]

## 2020-04-29 NOTE — ASSESSMENT & PLAN NOTE
- BP well controlled  - Will substitute Losartan for home Benicar while here; resume home meds at DC  - Monitor and adjust meds pending BP trends

## 2020-04-29 NOTE — HPI
Satnam Walter is a 50 y.o. male pt with HTN  and Morbid obesity who presented to the Emergency Department today with c/o substernal chest pain, onset 3 days PTA.  Info obtained from chart and pt. Patient states that the pain radiates to his L hand.  Symptoms are constant and moderate in severity.  No mitigating or exacerbating factors reported.  Patient denies any fever, chills, n/v/d, SOB, weakness, numbness, dizziness, headache, and all other sxs at this time.  No prior Tx reported.  No further complaints or concerns at this time.  ER workup showed:  Stable VS.  CBC unremarkable.  CMP showed BG of 577, which improved to 372 with 8 units of IV Insulin.  Beta hydroxybutyrate 1.3.  ABG showed ph 7.33, AG 14.  No known h/o DM.  A1c pending.  Troponin negative x 2.  12 lead EKG showed Normal sinus rhythm, Left anterior fascicular block.  Patient was given  mg in the ER and Hospital Medicine contacted for admission.  Cardiology consulted for chest pain.   Discussed with him regarding workup for chest pain, prior stress with ECG and no imaging. Will have pharm nuclear stress test tomorrow and ECHO. No CP currently.

## 2020-04-29 NOTE — SUBJECTIVE & OBJECTIVE
Past Medical History:   Diagnosis Date    Hypertension        Past Surgical History:   Procedure Laterality Date    CHOLECYSTECTOMY         Review of patient's allergies indicates:  No Known Allergies    No current facility-administered medications on file prior to encounter.      Current Outpatient Medications on File Prior to Encounter   Medication Sig    desloratadine (CLARINEX) 5 mg tablet     olmesartan-hydrochlorothiazide (BENICAR HCT) 40-25 mg per tablet Take 1 tablet by mouth once daily.      Family History     None        Tobacco Use    Smoking status: Unknown If Ever Smoked   Substance and Sexual Activity    Alcohol use: Not Currently    Drug use: Not Currently    Sexual activity: Yes     Partners: Female     Review of Systems   Constitutional: Negative for chills, diaphoresis, fatigue and fever.   HENT: Negative for hearing loss, mouth sores, sore throat, tinnitus and trouble swallowing.    Eyes: Negative for pain, discharge and redness.   Respiratory: Positive for chest tightness. Negative for apnea, cough, choking, shortness of breath, wheezing and stridor.         C/O chest pain/tightness PTA that has now resolved.   Cardiovascular: Negative for chest pain, palpitations and leg swelling.   Gastrointestinal: Negative for abdominal distention, abdominal pain, blood in stool, constipation, diarrhea, nausea, rectal pain and vomiting.   Endocrine: Positive for polydipsia, polyphagia and polyuria. Negative for cold intolerance and heat intolerance.   Genitourinary: Negative for difficulty urinating, dysuria, flank pain, frequency, hematuria and urgency.   Musculoskeletal: Negative for arthralgias, back pain, gait problem, joint swelling, neck pain and neck stiffness.   Skin: Negative for color change, rash and wound.   Allergic/Immunologic: Negative for food allergies.   Neurological: Negative for dizziness, tremors, seizures, syncope, speech difficulty, light-headedness and headaches.    Hematological: Negative for adenopathy. Does not bruise/bleed easily.   Psychiatric/Behavioral: Negative for agitation and confusion. The patient is not nervous/anxious.    All other systems reviewed and are negative.    Objective:     Vital Signs (Most Recent):  Temp: 98.3 °F (36.8 °C) (04/29/20 1544)  Pulse: 81 (04/29/20 1544)  Resp: 18 (04/29/20 1544)  BP: 131/63 (04/29/20 1544)  SpO2: 95 % (04/29/20 1544) Vital Signs (24h Range):  Temp:  [97.8 °F (36.6 °C)-98.4 °F (36.9 °C)] 98.3 °F (36.8 °C)  Pulse:  [72-89] 81  Resp:  [15-20] 18  SpO2:  [95 %-99 %] 95 %  BP: (124-138)/(63-86) 131/63     Weight: (!) 144.3 kg (318 lb 0.2 oz)  Body mass index is 45.63 kg/m².    Physical Exam   Constitutional: He is oriented to person, place, and time. He appears well-developed and well-nourished. No distress.   Morbidly obese AAM resting comfortably in bed in NAD.   HENT:   Head: Normocephalic and atraumatic.   Mouth/Throat: Oropharynx is clear and moist.   Eyes: Pupils are equal, round, and reactive to light. Conjunctivae and EOM are normal.   Neck: Normal range of motion. Neck supple. No JVD present.   Cardiovascular: Normal rate, regular rhythm, normal heart sounds and intact distal pulses. Exam reveals no gallop and no friction rub.   No murmur heard.  Pulmonary/Chest: Effort normal and breath sounds normal. No stridor. No respiratory distress. He has no wheezes. He has no rales. He exhibits no tenderness.   Abdominal: Soft. Bowel sounds are normal. He exhibits no distension and no mass. There is no tenderness. There is no rebound and no guarding.   Musculoskeletal: Normal range of motion. He exhibits no edema or tenderness.   Neurological: He is alert and oriented to person, place, and time. No cranial nerve deficit.   Skin: Skin is warm and dry. No rash noted. He is not diaphoretic. No erythema.   Psychiatric: He has a normal mood and affect. His behavior is normal. Judgment and thought content normal.   Nursing note and  vitals reviewed.        CRANIAL NERVES     CN III, IV, VI   Pupils are equal, round, and reactive to light.  Extraocular motions are normal.        Significant Labs:   ABGs:   Recent Labs   Lab 04/29/20  1314   PH 7.330*   PCO2 63.8*   HCO3 33.6*   POCSATURATED 30*   BE 8     CBC:   Recent Labs   Lab 04/29/20  1113   WBC 5.49   HGB 15.4   HCT 45.4        CMP:   Recent Labs   Lab 04/29/20  1113   *   K 4.7   CL 93*   CO2 27   *   BUN 19   CREATININE 1.4   CALCIUM 10.4   PROT 7.6   ALBUMIN 4.2   BILITOT 1.0   ALKPHOS 117   AST 15   ALT 29   ANIONGAP 14   EGFRNONAA 57*     Cardiac Markers:   Recent Labs   Lab 04/29/20  1113   BNP <10     Troponin:   Recent Labs   Lab 04/29/20  1113 04/29/20  1312   TROPONINI 0.023 0.017       Significant Imaging: I have reviewed all pertinent imaging results/findings within the past 24 hours.

## 2020-04-30 LAB
ANION GAP SERPL CALC-SCNC: 11 MMOL/L (ref 8–16)
ASCENDING AORTA: 3.19 CM
AV INDEX (PROSTH): 0.95
AV MEAN GRADIENT: 4 MMHG
AV PEAK GRADIENT: 7 MMHG
AV VALVE AREA: 3.06 CM2
AV VELOCITY RATIO: 0.77
BASOPHILS # BLD AUTO: 0.04 K/UL (ref 0–0.2)
BASOPHILS NFR BLD: 0.8 % (ref 0–1.9)
BSA FOR ECHO PROCEDURE: 2.67 M2
BUN SERPL-MCNC: 17 MG/DL (ref 6–20)
CALCIUM SERPL-MCNC: 9.7 MG/DL (ref 8.7–10.5)
CHLORIDE SERPL-SCNC: 98 MMOL/L (ref 95–110)
CO2 SERPL-SCNC: 27 MMOL/L (ref 23–29)
CREAT SERPL-MCNC: 1.1 MG/DL (ref 0.5–1.4)
CV ECHO LV RWT: 0.43 CM
CV STRESS BASE HR: 81 BPM
D DIMER PPP IA.FEU-MCNC: 0.21 MG/L FEU
DIASTOLIC BLOOD PRESSURE: 75 MMHG
DIFFERENTIAL METHOD: NORMAL
DOP CALC AO PEAK VEL: 1.28 M/S
DOP CALC AO VTI: 20.31 CM
DOP CALC LVOT AREA: 3.2 CM2
DOP CALC LVOT DIAMETER: 2.03 CM
DOP CALC LVOT PEAK VEL: 0.99 M/S
DOP CALC LVOT STROKE VOLUME: 62.11 CM3
DOP CALCLVOT PEAK VEL VTI: 19.2 CM
E WAVE DECELERATION TIME: 220.64 MSEC
E/A RATIO: 1.25
E/E' RATIO: 6.9 M/S
ECHO LV POSTERIOR WALL: 1.13 CM (ref 0.6–1.1)
EOSINOPHIL # BLD AUTO: 0.1 K/UL (ref 0–0.5)
EOSINOPHIL NFR BLD: 2.7 % (ref 0–8)
ERYTHROCYTE [DISTWIDTH] IN BLOOD BY AUTOMATED COUNT: 12.2 % (ref 11.5–14.5)
EST. GFR  (AFRICAN AMERICAN): >60 ML/MIN/1.73 M^2
EST. GFR  (NON AFRICAN AMERICAN): >60 ML/MIN/1.73 M^2
ESTIMATED AVG GLUCOSE: 249 MG/DL (ref 68–131)
FRACTIONAL SHORTENING: 32 % (ref 28–44)
GLUCOSE SERPL-MCNC: 431 MG/DL (ref 70–110)
HBA1C MFR BLD HPLC: 10.3 % (ref 4–5.6)
HCT VFR BLD AUTO: 43 % (ref 40–54)
HGB BLD-MCNC: 14.4 G/DL (ref 14–18)
IMM GRANULOCYTES # BLD AUTO: 0.02 K/UL (ref 0–0.04)
IMM GRANULOCYTES NFR BLD AUTO: 0.4 % (ref 0–0.5)
INTERVENTRICULAR SEPTUM: 1.15 CM (ref 0.6–1.1)
IVRT: 82.78 MSEC
LA MAJOR: 4.15 CM
LA MINOR: 3.85 CM
LEFT ATRIUM SIZE: 3.75 CM
LEFT INTERNAL DIMENSION IN SYSTOLE: 3.55 CM (ref 2.1–4)
LEFT VENTRICLE DIASTOLIC VOLUME INDEX: 50.94 ML/M2
LEFT VENTRICLE DIASTOLIC VOLUME: 129.48 ML
LEFT VENTRICLE MASS INDEX: 91 G/M2
LEFT VENTRICLE SYSTOLIC VOLUME INDEX: 20.7 ML/M2
LEFT VENTRICLE SYSTOLIC VOLUME: 52.55 ML
LEFT VENTRICULAR INTERNAL DIMENSION IN DIASTOLE: 5.2 CM (ref 3.5–6)
LEFT VENTRICULAR MASS: 231.81 G
LV LATERAL E/E' RATIO: 5.75 M/S
LV SEPTAL E/E' RATIO: 8.63 M/S
LYMPHOCYTES # BLD AUTO: 2.3 K/UL (ref 1–4.8)
LYMPHOCYTES NFR BLD: 43.3 % (ref 18–48)
MCH RBC QN AUTO: 29 PG (ref 27–31)
MCHC RBC AUTO-ENTMCNC: 33.5 G/DL (ref 32–36)
MCV RBC AUTO: 87 FL (ref 82–98)
MONOCYTES # BLD AUTO: 0.4 K/UL (ref 0.3–1)
MONOCYTES NFR BLD: 8.3 % (ref 4–15)
MV PEAK A VEL: 0.55 M/S
MV PEAK E VEL: 0.69 M/S
NEUTROPHILS # BLD AUTO: 2.4 K/UL (ref 1.8–7.7)
NEUTROPHILS NFR BLD: 44.5 % (ref 38–73)
NRBC BLD-RTO: 0 /100 WBC
NUC STRESS DIASTOLIC VOLUME INDEX: 98
NUC STRESS EJECTION FRACTION: 57 %
NUC STRESS SYSTOLIC VOLUME INDEX: 42
OHS CV CPX 85 PERCENT MAX PREDICTED HEART RATE MALE: 143
OHS CV CPX MAX PREDICTED HEART RATE: 168
OHS CV CPX PATIENT IS FEMALE: 0
OHS CV CPX PATIENT IS MALE: 1
OHS CV CPX PEAK DIASTOLIC BLOOD PRESSURE: 75 MMHG
OHS CV CPX PEAK HEAR RATE: 109 BPM
OHS CV CPX PEAK RATE PRESSURE PRODUCT: NORMAL
OHS CV CPX PEAK SYSTOLIC BLOOD PRESSURE: 123 MMHG
OHS CV CPX PERCENT MAX PREDICTED HEART RATE ACHIEVED: 65
OHS CV CPX RATE PRESSURE PRODUCT PRESENTING: 9963
PISA TR MAX VEL: 2.23 M/S
PLATELET # BLD AUTO: 217 K/UL (ref 150–350)
PMV BLD AUTO: 10.7 FL (ref 9.2–12.9)
POCT GLUCOSE: 357 MG/DL (ref 70–110)
POCT GLUCOSE: 365 MG/DL (ref 70–110)
POCT GLUCOSE: 394 MG/DL (ref 70–110)
POCT GLUCOSE: 449 MG/DL (ref 70–110)
POTASSIUM SERPL-SCNC: 4.9 MMOL/L (ref 3.5–5.1)
RA MAJOR: 3.01 CM
RBC # BLD AUTO: 4.96 M/UL (ref 4.6–6.2)
SINUS: 3.39 CM
SODIUM SERPL-SCNC: 136 MMOL/L (ref 136–145)
STJ: 3.38 CM
STRESS ECHO TARGET HR: 143 BPM
SYSTOLIC BLOOD PRESSURE: 123 MMHG
TDI LATERAL: 0.12 M/S
TDI SEPTAL: 0.08 M/S
TDI: 0.1 M/S
TR MAX PG: 20 MMHG
TRICUSPID ANNULAR PLANE SYSTOLIC EXCURSION: 1.98 CM
TROPONIN I SERPL DL<=0.01 NG/ML-MCNC: 0.01 NG/ML (ref 0–0.03)
TROPONIN I SERPL DL<=0.01 NG/ML-MCNC: 0.02 NG/ML (ref 0–0.03)
WBC # BLD AUTO: 5.27 K/UL (ref 3.9–12.7)

## 2020-04-30 PROCEDURE — C9399 UNCLASSIFIED DRUGS OR BIOLOG: HCPCS | Performed by: FAMILY MEDICINE

## 2020-04-30 PROCEDURE — 85379 FIBRIN DEGRADATION QUANT: CPT

## 2020-04-30 PROCEDURE — G0378 HOSPITAL OBSERVATION PER HR: HCPCS

## 2020-04-30 PROCEDURE — 99214 OFFICE O/P EST MOD 30 MIN: CPT | Mod: 25,,, | Performed by: INTERNAL MEDICINE

## 2020-04-30 PROCEDURE — 96372 THER/PROPH/DIAG INJ SC/IM: CPT

## 2020-04-30 PROCEDURE — 63600175 PHARM REV CODE 636 W HCPCS: Performed by: INTERNAL MEDICINE

## 2020-04-30 PROCEDURE — 25000003 PHARM REV CODE 250: Performed by: NURSE PRACTITIONER

## 2020-04-30 PROCEDURE — 99214 PR OFFICE/OUTPT VISIT, EST, LEVL IV, 30-39 MIN: ICD-10-PCS | Mod: 25,,, | Performed by: INTERNAL MEDICINE

## 2020-04-30 PROCEDURE — 85025 COMPLETE CBC W/AUTO DIFF WBC: CPT

## 2020-04-30 PROCEDURE — 63600175 PHARM REV CODE 636 W HCPCS: Performed by: FAMILY MEDICINE

## 2020-04-30 PROCEDURE — 36415 COLL VENOUS BLD VENIPUNCTURE: CPT

## 2020-04-30 PROCEDURE — 11000001 HC ACUTE MED/SURG PRIVATE ROOM

## 2020-04-30 PROCEDURE — 80048 BASIC METABOLIC PNL TOTAL CA: CPT

## 2020-04-30 PROCEDURE — 84484 ASSAY OF TROPONIN QUANT: CPT | Mod: 91

## 2020-04-30 PROCEDURE — 84484 ASSAY OF TROPONIN QUANT: CPT

## 2020-04-30 PROCEDURE — 82962 GLUCOSE BLOOD TEST: CPT

## 2020-04-30 RX ADMIN — REGADENOSON 0.4 MG: 0.08 INJECTION, SOLUTION INTRAVENOUS at 12:04

## 2020-04-30 RX ADMIN — LOSARTAN POTASSIUM 25 MG: 25 TABLET ORAL at 08:04

## 2020-04-30 RX ADMIN — INSULIN ASPART 10 UNITS: 100 INJECTION, SOLUTION INTRAVENOUS; SUBCUTANEOUS at 06:04

## 2020-04-30 RX ADMIN — INSULIN ASPART 10 UNITS: 100 INJECTION, SOLUTION INTRAVENOUS; SUBCUTANEOUS at 05:04

## 2020-04-30 RX ADMIN — HUMAN ALBUMIN MICROSPHERES AND PERFLUTREN 0.66 MG: 10; .22 INJECTION, SOLUTION INTRAVENOUS at 09:04

## 2020-04-30 RX ADMIN — ASPIRIN 81 MG 81 MG: 81 TABLET ORAL at 08:04

## 2020-04-30 RX ADMIN — INSULIN ASPART 10 UNITS: 100 INJECTION, SOLUTION INTRAVENOUS; SUBCUTANEOUS at 11:04

## 2020-04-30 RX ADMIN — INSULIN DETEMIR 20 UNITS: 100 INJECTION, SOLUTION SUBCUTANEOUS at 09:04

## 2020-04-30 RX ADMIN — INSULIN ASPART 5 UNITS: 100 INJECTION, SOLUTION INTRAVENOUS; SUBCUTANEOUS at 09:04

## 2020-04-30 NOTE — ASSESSMENT & PLAN NOTE
- No known h/o DM; beta hydroxybutyrate 1.3, AG normal; no prior A1c available for review  - A1c pending  - Given 8 units of IV insulin in the Er with improvement in BG  - Accu checks ACHS with moderate dose SSI PRN  - ADA diet  - Monitor      4/30/20  Strongly suspect long standing  A1c uncontrolled  Extensive discussion / education  Patient motivated to get under control  Added long acting insulin today.  Request to be discharged with oral medications and will be able to follow up with PCP.  Plan discharge with Statin, ARB, DM medication and discussed need for all annual screening/ vaccinations.

## 2020-04-30 NOTE — SUBJECTIVE & OBJECTIVE
Review of Systems   Constitution: Negative.   HENT: Negative.    Eyes: Negative.    Cardiovascular: Negative.    Respiratory: Negative.    Endocrine: Negative.    Hematologic/Lymphatic: Negative.    Skin: Negative.    Musculoskeletal: Negative.    Gastrointestinal: Negative.    Genitourinary: Negative.    Neurological: Negative.    Psychiatric/Behavioral: Negative.    Allergic/Immunologic: Negative.      Objective:     Vital Signs (Most Recent):  Temp: 98.3 °F (36.8 °C) (04/30/20 0819)  Pulse: 75 (04/30/20 1305)  Resp: 15 (04/30/20 0819)  BP: 123/75 (04/30/20 1022)  SpO2: 98 % (04/30/20 0819) Vital Signs (24h Range):  Temp:  [97.8 °F (36.6 °C)-98.3 °F (36.8 °C)] 98.3 °F (36.8 °C)  Pulse:  [] 75  Resp:  [15-18] 15  SpO2:  [93 %-99 %] 98 %  BP: (123-142)/(63-83) 123/75     Weight: (!) 144.2 kg (318 lb)  Body mass index is 45.63 kg/m².     SpO2: 98 %  O2 Device (Oxygen Therapy): room air      Intake/Output Summary (Last 24 hours) at 4/30/2020 1508  Last data filed at 4/29/2020 1831  Gross per 24 hour   Intake 600 ml   Output --   Net 600 ml       Lines/Drains/Airways     Peripheral Intravenous Line                 Peripheral IV - Single Lumen 04/29/20 1114 20 G Left Hand 1 day                Physical Exam   Constitutional: He is oriented to person, place, and time. He appears well-developed and well-nourished. No distress.   HENT:   Head: Normocephalic and atraumatic.   Eyes: Pupils are equal, round, and reactive to light. Right eye exhibits no discharge. Left eye exhibits no discharge.   Neck: Neck supple. No JVD present.   Cardiovascular: Normal rate, regular rhythm, S1 normal, S2 normal and normal heart sounds.   No murmur heard.  Pulmonary/Chest: Effort normal and breath sounds normal. No respiratory distress. He has no wheezes. He has no rales.   Abdominal: Soft. He exhibits no distension.   Musculoskeletal: He exhibits no edema.   Neurological: He is alert and oriented to person, place, and time.    Skin: Skin is warm and dry. He is not diaphoretic. No erythema.   Psychiatric: He has a normal mood and affect. His behavior is normal. Thought content normal.   Nursing note and vitals reviewed.      Significant Labs:   CMP   Recent Labs   Lab 04/29/20  1113 04/30/20  0656   * 136   K 4.7 4.9   CL 93* 98   CO2 27 27   * 431*   BUN 19 17   CREATININE 1.4 1.1   CALCIUM 10.4 9.7   PROT 7.6  --    ALBUMIN 4.2  --    BILITOT 1.0  --    ALKPHOS 117  --    AST 15  --    ALT 29  --    ANIONGAP 14 11   ESTGFRAFRICA >60 >60   EGFRNONAA 57* >60   , CBC   Recent Labs   Lab 04/29/20  1113 04/30/20  0655   WBC 5.49 5.27   HGB 15.4 14.4   HCT 45.4 43.0    217   , Troponin   Recent Labs   Lab 04/29/20  1854 04/30/20  0117 04/30/20  0655   TROPONINI 0.023 0.014 0.016    and All pertinent lab results from the last 24 hours have been reviewed.    Significant Imaging: Echocardiogram: 2D echo with color flow doppler: No results found for this or any previous visit., EKG: Reviewed and X-Ray: CXR: X-Ray Chest 1 View (CXR): No results found for this visit on 04/29/20. and X-Ray Chest PA and Lateral (CXR): No results found for this visit on 04/29/20.

## 2020-04-30 NOTE — ASSESSMENT & PLAN NOTE
- ADA, Cardiac diet  -  on the need for weight loss and diet control    4/30/20  Will need weight loss.  He is aware and motivated.

## 2020-04-30 NOTE — HOSPITAL COURSE
4/30/2020-Patient seen and examined today. Feels well. No recurrence of chest pain. No other complaints. Troponin x 3 negative. Echo showed normal EF, no WMA. MPI stress test today.

## 2020-04-30 NOTE — PLAN OF CARE
No adverse events during shift.  Remained free of falls. Call light in reach. Side rails x 2. No complaints of pain. Patient repositions independently. Accucheck ACHS. Supplemental insulin administered per MAR. VSS. Chart reviewed, will continue to monitor.

## 2020-04-30 NOTE — ASSESSMENT & PLAN NOTE
R/o ACS  Cont enzymes, ECGs  Pharm Nuclear stress test tomorrow   If neg discussed other causes - LALY, GI/MSK   NPO past midnight    4/30/2020  -No recurrence since admission  -Troponin x 3 negative  -Echo showed normal EF, no WMA  -MPI stress test  -Further rec's to follow

## 2020-04-30 NOTE — PROGRESS NOTES
[facility] Hospital Medicine  Progress Note    Patient Name: Satnam Walter  MRN: [id number]  Patient Class: OP- Observation   Admission Date: 4/29/2020  Length of Stay: 0 days  Attending Physician: Toy De Los Santos MD  Primary Care Provider: Pro Levine MD        Subjective:     Principal Problem:Chest pain        HPI:  Satnam Walter is a 50 y.o. male patient with a PMHx of HTN  and Morbid obesity who presented to the Emergency Department today with c/o substernal chest pain, onset 3 days PTA.  Patient states that the pain radiates to his L hand.  Symptoms are constant and moderate in severity.  No mitigating or exacerbating factors reported.  Patient denies any fever, chills, n/v/d, SOB, weakness, numbness, dizziness, headache, and all other sxs at this time.  No prior Tx reported.  No further complaints or concerns at this time.  ER workup showed:  Stable VS.  CBC unremarkable.  CMP showed BG of 577, which improved to 372 with 8 units of IV Insulin.  Beta hydroxybutyrate 1.3.  ABG showed ph 7.33, AG 14.  No known h/o DM.  A1c pending.  Troponin negative x 2.  12 lead EKG showed Normal sinus rhythm, Left anterior fascicular block.  Patient was given  mg in the ER and Hospital Medicine contacted for admission.  Cardiology consult pending given multiple risk factors.  Patient will be placed in OBS.  He is a Full Code.  His SDM is his wife Loly who can be reached at [phone].      Overview/Hospital Course:  4/30/20  Seen shortly after stress test - results pending  No distress. Comfortable.  No chest pain since admit.  Reviewed diabetes with patient - a1c >10. Suspect long standing.      Interval History: stress test.    Review of Systems   Constitutional: Negative for chills and fever.   HENT: Negative for trouble swallowing.         NPo at present   Respiratory: Negative for choking and shortness of breath.    Cardiovascular: Negative for chest pain.   Gastrointestinal:  Negative for abdominal pain.   Genitourinary: Negative for difficulty urinating.   Skin: Negative for color change.   Neurological: Negative for dizziness.   Psychiatric/Behavioral: Negative for confusion.     Objective:     Vital Signs (Most Recent):  Temp: 98.3 °F (36.8 °C) (04/30/20 0819)  Pulse: 72 (04/30/20 1022)  Resp: 15 (04/30/20 0819)  BP: 123/75 (04/30/20 1022)  SpO2: 98 % (04/30/20 0819) Vital Signs (24h Range):  Temp:  [97.8 °F (36.6 °C)-98.4 °F (36.9 °C)] 98.3 °F (36.8 °C)  Pulse:  [] 72  Resp:  [15-20] 15  SpO2:  [93 %-99 %] 98 %  BP: (123-142)/(63-86) 123/75     Weight: (!) 144.2 kg (318 lb)  Body mass index is 45.63 kg/m².    Intake/Output Summary (Last 24 hours) at 4/30/2020 1115  Last data filed at 4/29/2020 1831  Gross per 24 hour   Intake 600 ml   Output --   Net 600 ml      Physical Exam   Constitutional: He is oriented to person, place, and time.   Morbid obesity   HENT:   Head: Normocephalic.   Eyes: Conjunctivae are normal.   Cardiovascular: Normal rate and regular rhythm.   Pulmonary/Chest: Effort normal and breath sounds normal. No respiratory distress.   Abdominal: There is no tenderness.   Neurological: He is alert and oriented to person, place, and time.   Skin: No pallor.   Psychiatric: He has a normal mood and affect. His behavior is normal. Judgment and thought content normal.       Significant Labs: All pertinent labs within the past 24 hours have been reviewed.    Significant Imaging: I have reviewed and interpreted all pertinent imaging results/findings within the past 24 hours.      Assessment/Plan:      * Chest pain  - Place in OBS  - No known h/o cardiac dx; reports negative ST at [facility] in 2018 (records unavailable to review)  - 12 lead EKG showed NSR with left anterior fascicular block  - Troponin x 2 negative, will trend  - Given  mg in the ER, continue 81 mg daily  - Cardiology consult pending for any additional recs  - Cardiac, ADA diet, NPO after MN  - Lipid  panel pending  - Tele monitoring    4/30/20  No chest pain since admitted  Monitor  Stress pending  Cards following    Morbid obesity  - ADA, Cardiac diet  -  on the need for weight loss and diet control    4/30/20  Will need weight loss.  He is aware and motivated.      HTN (hypertension)  - BP well controlled  - Will substitute Losartan for home Benicar while here; resume home meds at DC  - Monitor and adjust meds pending BP trends    4/30/20  ARB  Monitor  Discussed pressure control    Hyperglycemia  - No known h/o DM; beta hydroxybutyrate 1.3, AG normal; no prior A1c available for review  - A1c pending  - Given 8 units of IV insulin in the Er with improvement in BG  - Accu checks ACHS with moderate dose SSI PRN  - ADA diet  - Monitor      4/30/20  Strongly suspect long standing  A1c uncontrolled  Extensive discussion / education  Patient motivated to get under control  Added long acting insulin today.  Request to be discharged with oral medications and will be able to follow up with PCP.  Plan discharge with Statin, ARB, DM medication and discussed need for all annual screening/ vaccinations.         VTE Risk Mitigation (From admission, onward)         Ordered     IP VTE HIGH RISK PATIENT  Once      04/29/20 1547     Place sequential compression device  Until discontinued      04/29/20 1547                      Toy De Los Santos MD  Department of Hospital Medicine   [facility]

## 2020-04-30 NOTE — PROGRESS NOTES
[facility] -   Cardiology  Progress Note    Patient Name: Satnam Walter  MRN: [id number]  Admission Date: 4/29/2020  Hospital Length of Stay: 0 days  Code Status: Full Code   Attending Physician: Toy De Los Santos MD   Primary Care Physician: Pro Levine MD  Expected Discharge Date:   Principal Problem:Chest pain    Subjective:   HPI:  Satnam Walter is a 50 y.o. male pt with HTN  and Morbid obesity who presented to the Emergency Department today with c/o substernal chest pain, onset 3 days PTA.  Info obtained from chart and pt. Patient states that the pain radiates to his L hand.  Symptoms are constant and moderate in severity.  No mitigating or exacerbating factors reported.  Patient denies any fever, chills, n/v/d, SOB, weakness, numbness, dizziness, headache, and all other sxs at this time.  No prior Tx reported.  No further complaints or concerns at this time.  ER workup showed:  Stable VS.  CBC unremarkable.  CMP showed BG of 577, which improved to 372 with 8 units of IV Insulin.  Beta hydroxybutyrate 1.3.  ABG showed ph 7.33, AG 14.  No known h/o DM.  A1c pending.  Troponin negative x 2.  12 lead EKG showed Normal sinus rhythm, Left anterior fascicular block.  Patient was given  mg in the ER and Hospital Medicine contacted for admission.  Cardiology consulted for chest pain.   Discussed with him regarding workup for chest pain, prior stress with ECG and no imaging. Will have pharm nuclear stress test tomorrow and ECHO. No CP currently.    Hospital Course:   4/30/2020-Patient seen and examined today. Feels well. No recurrence of chest pain. No other complaints. Troponin x 3 negative. Echo showed normal EF, no WMA. MPI stress test today.        Review of Systems   Constitution: Negative.   HENT: Negative.    Eyes: Negative.    Cardiovascular: Negative.    Respiratory: Negative.    Endocrine: Negative.    Hematologic/Lymphatic: Negative.    Skin: Negative.    Musculoskeletal:  Negative.    Gastrointestinal: Negative.    Genitourinary: Negative.    Neurological: Negative.    Psychiatric/Behavioral: Negative.    Allergic/Immunologic: Negative.      Objective:     Vital Signs (Most Recent):  Temp: 98.3 °F (36.8 °C) (04/30/20 0819)  Pulse: 75 (04/30/20 1305)  Resp: 15 (04/30/20 0819)  BP: 123/75 (04/30/20 1022)  SpO2: 98 % (04/30/20 0819) Vital Signs (24h Range):  Temp:  [97.8 °F (36.6 °C)-98.3 °F (36.8 °C)] 98.3 °F (36.8 °C)  Pulse:  [] 75  Resp:  [15-18] 15  SpO2:  [93 %-99 %] 98 %  BP: (123-142)/(63-83) 123/75     Weight: (!) 144.2 kg (318 lb)  Body mass index is 45.63 kg/m².     SpO2: 98 %  O2 Device (Oxygen Therapy): room air      Intake/Output Summary (Last 24 hours) at 4/30/2020 1508  Last data filed at 4/29/2020 1831  Gross per 24 hour   Intake 600 ml   Output --   Net 600 ml       Lines/Drains/Airways     Peripheral Intravenous Line                 Peripheral IV - Single Lumen 04/29/20 1114 20 G Left Hand 1 day                Physical Exam   Constitutional: He is oriented to person, place, and time. He appears well-developed and well-nourished. No distress.   HENT:   Head: Normocephalic and atraumatic.   Eyes: Pupils are equal, round, and reactive to light. Right eye exhibits no discharge. Left eye exhibits no discharge.   Neck: Neck supple. No JVD present.   Cardiovascular: Normal rate, regular rhythm, S1 normal, S2 normal and normal heart sounds.   No murmur heard.  Pulmonary/Chest: Effort normal and breath sounds normal. No respiratory distress. He has no wheezes. He has no rales.   Abdominal: Soft. He exhibits no distension.   Musculoskeletal: He exhibits no edema.   Neurological: He is alert and oriented to person, place, and time.   Skin: Skin is warm and dry. He is not diaphoretic. No erythema.   Psychiatric: He has a normal mood and affect. His behavior is normal. Thought content normal.   Nursing note and vitals reviewed.      Significant Labs:   CMP   Recent Labs    Lab 04/29/20  1113 04/30/20  0656   * 136   K 4.7 4.9   CL 93* 98   CO2 27 27   * 431*   BUN 19 17   CREATININE 1.4 1.1   CALCIUM 10.4 9.7   PROT 7.6  --    ALBUMIN 4.2  --    BILITOT 1.0  --    ALKPHOS 117  --    AST 15  --    ALT 29  --    ANIONGAP 14 11   ESTGFRAFRICA >60 >60   EGFRNONAA 57* >60   , CBC   Recent Labs   Lab 04/29/20  1113 04/30/20  0655   WBC 5.49 5.27   HGB 15.4 14.4   HCT 45.4 43.0    217   , Troponin   Recent Labs   Lab 04/29/20  1854 04/30/20  0117 04/30/20  0655   TROPONINI 0.023 0.014 0.016    and All pertinent lab results from the last 24 hours have been reviewed.    Significant Imaging: Echocardiogram: 2D echo with color flow doppler: No results found for this or any previous visit., EKG: Reviewed and X-Ray: CXR: X-Ray Chest 1 View (CXR): No results found for this visit on 04/29/20. and X-Ray Chest PA and Lateral (CXR): No results found for this visit on 04/29/20.    Assessment and Plan:   Patient who presents with chest pain. Workup thus far negative. MPI stress test pending. Further rec's to follow.    * Chest pain  R/o ACS  Cont enzymes, ECGs  Pharm Nuclear stress test tomorrow   If neg discussed other causes - LALY, GI/MSK   NPO past midnight    4/30/2020  -No recurrence since admission  -Troponin x 3 negative  -Echo showed normal EF, no WMA  -MPI stress test  -Further rec's to follow    Morbid obesity  -Needs weight loss with diet/exercise  -May be candidate for bariatric surgery     HTN (hypertension)  -Continue current meds  -Monitor    Hyperglycemia  -Cont tx per primary team        VTE Risk Mitigation (From admission, onward)         Ordered     IP VTE HIGH RISK PATIENT  Once      04/29/20 1547     Place sequential compression device  Until discontinued      04/29/20 1547                Joycelyn Jackson PA-C  Cardiology  [facility]

## 2020-04-30 NOTE — PLAN OF CARE
Fall prevention precautions maintained. Pt remained free of falls. Call light and personal items within easy reach. Pt denied pain throughout shift. Pt NPO since midnight as ordered for nuclear stress test this morning. Accucheck at 9p, was 385, covered with sliding scale insulin. Accucheck at 6a was 365, covered with sliding scale insulin. 24 hour chart check completed. Will continue to monitor.

## 2020-04-30 NOTE — HOSPITAL COURSE
4/30/20  Seen shortly after stress test - results pending  No distress. Comfortable.  No chest pain since admit.  Reviewed diabetes with patient - a1c >10. Suspect long standing.    5/1/20  Stress test negative yesterday.  Planned discharged yesterday - however sugars significantly elevated. Long acting insulin overnight started. Tolerated with some benefit, but still elevated sugars. Discussed his glucose has likely been elevated for significant period of time given A1c >10.  Patient underwent diabetes education. Reports he is comfortable with insulin use at home. To be discharged with 20 units long acting insulin, metformin BID, statin, and to continue ARB. Patient to be arranged for follow up with DM mngt department for further monitoring. Patient will follow up with current PCP within a few weeks to monitor statin use / metformin use until transitions fully to [facility] for care.   Seen at bedside. Comfortable. No distress. Tolerating PO intake. Understands severity of diabetes. Motivated for self care.  Deemed stable for discharge.

## 2020-04-30 NOTE — CONSULTS
Consulted on this 49 y/o for newly diagnosed diabetes. Patient currently admitted with Chest Pain. Denies any chest pain at this time. Upon chart review, HgbA1C was 10.3 and WBG around noon was 357 - patient was administered insulin per sliding scale coverage. Patient was not aware until today that he was diabetic. Neither one of patient's parents had diabetes. Patient also relayed that he has been experiencing increased thirst and dry mouth as well as frequent urination and blurry vision. Spoke with patient regarding physiology of diabetes and the significance of HgbA1C result. Patient verbalized understanding. Patient does drink sodas and fruit juices as well as eats a large amount of watermelon. Spoke with patient regarding proper eating schedule and diet. Discussed the importance of a bedtime snack. Also discussed the importance of water consumption - patient states that he drinks a lot of water daily. Discussed signs, symptoms and treatment of hypoglycemia. Provided meter and discussed proper technique and frequency of monitoring his blood glucose at home. Also provided patient blood glucose ranges.       Instructed in technique for insulin administration using the insulin pen to include  1.Attaching pen needle/2 unit air/safety shot  2.Dialing accurate dose  3 Injection technique  4.Site selection/rotation  5 Insulin storage  6.Proper needle disposal    Written literature and blood glucose log provided. Patient does not have any questions at this time. Patient is very willing to follow diabetic diet, exercise recommendations and be compliant with his medications.

## 2020-04-30 NOTE — SUBJECTIVE & OBJECTIVE
Interval History: stress test.    Review of Systems   Constitutional: Negative for chills and fever.   HENT: Negative for trouble swallowing.         NPo at present   Respiratory: Negative for choking and shortness of breath.    Cardiovascular: Negative for chest pain.   Gastrointestinal: Negative for abdominal pain.   Genitourinary: Negative for difficulty urinating.   Skin: Negative for color change.   Neurological: Negative for dizziness.   Psychiatric/Behavioral: Negative for confusion.     Objective:     Vital Signs (Most Recent):  Temp: 98.3 °F (36.8 °C) (04/30/20 0819)  Pulse: 72 (04/30/20 1022)  Resp: 15 (04/30/20 0819)  BP: 123/75 (04/30/20 1022)  SpO2: 98 % (04/30/20 0819) Vital Signs (24h Range):  Temp:  [97.8 °F (36.6 °C)-98.4 °F (36.9 °C)] 98.3 °F (36.8 °C)  Pulse:  [] 72  Resp:  [15-20] 15  SpO2:  [93 %-99 %] 98 %  BP: (123-142)/(63-86) 123/75     Weight: (!) 144.2 kg (318 lb)  Body mass index is 45.63 kg/m².    Intake/Output Summary (Last 24 hours) at 4/30/2020 1115  Last data filed at 4/29/2020 1831  Gross per 24 hour   Intake 600 ml   Output --   Net 600 ml      Physical Exam   Constitutional: He is oriented to person, place, and time.   Morbid obesity   HENT:   Head: Normocephalic.   Eyes: Conjunctivae are normal.   Cardiovascular: Normal rate and regular rhythm.   Pulmonary/Chest: Effort normal and breath sounds normal. No respiratory distress.   Abdominal: There is no tenderness.   Neurological: He is alert and oriented to person, place, and time.   Skin: No pallor.   Psychiatric: He has a normal mood and affect. His behavior is normal. Judgment and thought content normal.       Significant Labs: All pertinent labs within the past 24 hours have been reviewed.    Significant Imaging: I have reviewed and interpreted all pertinent imaging results/findings within the past 24 hours.

## 2020-04-30 NOTE — ASSESSMENT & PLAN NOTE
- Place in OBS  - No known h/o cardiac dx; reports negative ST at [facility] in 2018 (records unavailable to review)  - 12 lead EKG showed NSR with left anterior fascicular block  - Troponin x 2 negative, will trend  - Given  mg in the ER, continue 81 mg daily  - Cardiology consult pending for any additional recs  - Cardiac, ADA diet, NPO after MN  - Lipid panel pending  - Tele monitoring    4/30/20  No chest pain since admitted  Monitor  Stress pending  Cards following

## 2020-04-30 NOTE — ASSESSMENT & PLAN NOTE
- BP well controlled  - Will substitute Losartan for home Benicar while here; resume home meds at DC  - Monitor and adjust meds pending BP trends    4/30/20  ARB  Monitor  Discussed pressure control

## 2020-05-01 VITALS
OXYGEN SATURATION: 98 % | DIASTOLIC BLOOD PRESSURE: 71 MMHG | SYSTOLIC BLOOD PRESSURE: 109 MMHG | WEIGHT: 315 LBS | BODY MASS INDEX: 45.1 KG/M2 | HEIGHT: 70 IN | TEMPERATURE: 98 F | RESPIRATION RATE: 18 BRPM | HEART RATE: 70 BPM

## 2020-05-01 PROBLEM — R07.9 CHEST PAIN: Status: RESOLVED | Noted: 2020-04-29 | Resolved: 2020-05-01

## 2020-05-01 LAB
ANION GAP SERPL CALC-SCNC: 11 MMOL/L (ref 8–16)
BASOPHILS # BLD AUTO: 0.03 K/UL (ref 0–0.2)
BASOPHILS NFR BLD: 0.6 % (ref 0–1.9)
BUN SERPL-MCNC: 16 MG/DL (ref 6–20)
CALCIUM SERPL-MCNC: 9.8 MG/DL (ref 8.7–10.5)
CHLORIDE SERPL-SCNC: 98 MMOL/L (ref 95–110)
CO2 SERPL-SCNC: 29 MMOL/L (ref 23–29)
CREAT SERPL-MCNC: 1 MG/DL (ref 0.5–1.4)
DIFFERENTIAL METHOD: ABNORMAL
EOSINOPHIL # BLD AUTO: 0.2 K/UL (ref 0–0.5)
EOSINOPHIL NFR BLD: 3.4 % (ref 0–8)
ERYTHROCYTE [DISTWIDTH] IN BLOOD BY AUTOMATED COUNT: 12.1 % (ref 11.5–14.5)
EST. GFR  (AFRICAN AMERICAN): >60 ML/MIN/1.73 M^2
EST. GFR  (NON AFRICAN AMERICAN): >60 ML/MIN/1.73 M^2
GLUCOSE SERPL-MCNC: 397 MG/DL (ref 70–110)
HCT VFR BLD AUTO: 43.8 % (ref 40–54)
HGB BLD-MCNC: 14.3 G/DL (ref 14–18)
IMM GRANULOCYTES # BLD AUTO: 0.03 K/UL (ref 0–0.04)
IMM GRANULOCYTES NFR BLD AUTO: 0.6 % (ref 0–0.5)
LYMPHOCYTES # BLD AUTO: 2.6 K/UL (ref 1–4.8)
LYMPHOCYTES NFR BLD: 47.9 % (ref 18–48)
MCH RBC QN AUTO: 28.7 PG (ref 27–31)
MCHC RBC AUTO-ENTMCNC: 32.6 G/DL (ref 32–36)
MCV RBC AUTO: 88 FL (ref 82–98)
MONOCYTES # BLD AUTO: 0.5 K/UL (ref 0.3–1)
MONOCYTES NFR BLD: 9 % (ref 4–15)
NEUTROPHILS # BLD AUTO: 2.1 K/UL (ref 1.8–7.7)
NEUTROPHILS NFR BLD: 38.5 % (ref 38–73)
NRBC BLD-RTO: 0 /100 WBC
PLATELET # BLD AUTO: 220 K/UL (ref 150–350)
PMV BLD AUTO: 10.5 FL (ref 9.2–12.9)
POCT GLUCOSE: 348 MG/DL (ref 70–110)
POCT GLUCOSE: >500 MG/DL (ref 70–110)
POTASSIUM SERPL-SCNC: 4.7 MMOL/L (ref 3.5–5.1)
RBC # BLD AUTO: 4.99 M/UL (ref 4.6–6.2)
SODIUM SERPL-SCNC: 138 MMOL/L (ref 136–145)
WBC # BLD AUTO: 5.32 K/UL (ref 3.9–12.7)

## 2020-05-01 PROCEDURE — 36415 COLL VENOUS BLD VENIPUNCTURE: CPT

## 2020-05-01 PROCEDURE — 85025 COMPLETE CBC W/AUTO DIFF WBC: CPT

## 2020-05-01 PROCEDURE — G0378 HOSPITAL OBSERVATION PER HR: HCPCS

## 2020-05-01 PROCEDURE — 25000003 PHARM REV CODE 250: Performed by: NURSE PRACTITIONER

## 2020-05-01 PROCEDURE — 80048 BASIC METABOLIC PNL TOTAL CA: CPT

## 2020-05-01 RX ORDER — METFORMIN HYDROCHLORIDE 500 MG/1
500 TABLET ORAL 2 TIMES DAILY WITH MEALS
Qty: 180 TABLET | Refills: 0 | Status: SHIPPED | OUTPATIENT
Start: 2020-05-01 | End: 2021-09-07

## 2020-05-01 RX ORDER — INSULIN GLARGINE 100 [IU]/ML
20 INJECTION, SOLUTION SUBCUTANEOUS DAILY
Qty: 15 ML | Refills: 0 | Status: SHIPPED | OUTPATIENT
Start: 2020-05-01 | End: 2020-05-05 | Stop reason: SDUPTHER

## 2020-05-01 RX ORDER — ATORVASTATIN CALCIUM 20 MG/1
20 TABLET, FILM COATED ORAL DAILY
Qty: 90 TABLET | Refills: 0 | Status: SHIPPED | OUTPATIENT
Start: 2020-05-01 | End: 2021-05-01

## 2020-05-01 RX ADMIN — INSULIN ASPART 8 UNITS: 100 INJECTION, SOLUTION INTRAVENOUS; SUBCUTANEOUS at 06:05

## 2020-05-01 RX ADMIN — LOSARTAN POTASSIUM 25 MG: 25 TABLET ORAL at 08:05

## 2020-05-01 RX ADMIN — ASPIRIN 81 MG 81 MG: 81 TABLET ORAL at 08:05

## 2020-05-01 NOTE — NURSING
D/C papers and instructions given to patient at this time. Pt verbalized understanding. Pt waiting on on bedside meds to be delivered.

## 2020-05-01 NOTE — PLAN OF CARE
05/01/20 1044   Final Note   Assessment Type Final Discharge Note   Anticipated Discharge Disposition Home   Right Care Referral Info   Post Acute Recommendation No Care

## 2020-05-01 NOTE — NURSING
Home Medications delivered to patient. Pt ambulated off unit with all personal belongings to private vehicle. JAILYN

## 2020-05-01 NOTE — DISCHARGE SUMMARY
[facility] Hospital Medicine  Discharge Summary      Patient Name: Satnam Walter  MRN: [id number]  Admission Date: 4/29/2020  Hospital Length of Stay: 1 days  Discharge Date and Time:  05/01/2020 8:10 AM  Attending Physician: Toy De Los Santos MD   Discharging Provider: Toy De Los Santos MD  Primary Care Provider: Pro Levine MD      HPI:   Satnam Walter is a 50 y.o. male patient with a PMHx of HTN  and Morbid obesity who presented to the Emergency Department today with c/o substernal chest pain, onset 3 days PTA.  Patient states that the pain radiates to his L hand.  Symptoms are constant and moderate in severity.  No mitigating or exacerbating factors reported.  Patient denies any fever, chills, n/v/d, SOB, weakness, numbness, dizziness, headache, and all other sxs at this time.  No prior Tx reported.  No further complaints or concerns at this time.  ER workup showed:  Stable VS.  CBC unremarkable.  CMP showed BG of 577, which improved to 372 with 8 units of IV Insulin.  Beta hydroxybutyrate 1.3.  ABG showed ph 7.33, AG 14.  No known h/o DM.  A1c pending.  Troponin negative x 2.  12 lead EKG showed Normal sinus rhythm, Left anterior fascicular block.  Patient was given  mg in the ER and Hospital Medicine contacted for admission.  Cardiology consult pending given multiple risk factors.  Patient will be placed in OBS.  He is a Full Code.  His SDM is his wife Loly who can be reached at [phone].      * No surgery found *      Hospital Course:   4/30/20  Seen shortly after stress test - results pending  No distress. Comfortable.  No chest pain since admit.  Reviewed diabetes with patient - a1c >10. Suspect long standing.    5/1/20  Stress test negative yesterday.  Planned discharged yesterday - however sugars significantly elevated. Long acting insulin overnight started. Tolerated with some benefit, but still elevated sugars. Discussed his glucose has likely been elevated for  significant period of time given A1c >10.  Patient underwent diabetes education. Reports he is comfortable with insulin use at home. To be discharged with 20 units long acting insulin, metformin BID, statin, and to continue ARB. Patient to be arranged for follow up with DM mngt department for further monitoring. Patient will follow up with current PCP within a few weeks to monitor statin use / metformin use until transitions fully to [facility] for care.   Seen at bedside. Comfortable. No distress. Tolerating PO intake. Understands severity of diabetes. Motivated for self care.  Deemed stable for discharge.          Consults:   Consults (From admission, onward)        Status Ordering Provider     Inpatient consult to Cardiology  Once     Provider:  Jono Manning MD    Completed VICKIE BARILLAS     Inpatient consult to Diabetes educator  Once     Provider:  (Not yet assigned)    Completed NICHOLAS MAHMOOD     Inpatient consult to Diabetes educator  Once     Provider:  (Not yet assigned)    Acknowledged NICHOALS MAHMOOD     Inpatient consult to Registered Dietitian/Nutritionist  Once     Provider:  (Not yet assigned)    Acknowledged NICHOLAS MAHMOOD          No new Assessment & Plan notes have been filed under this hospital service since the last note was generated.  Service: Hospital Medicine    Final Active Diagnoses:    Diagnosis Date Noted POA    Hyperglycemia [R73.9] 04/29/2020 Yes    HTN (hypertension) [I10] 04/29/2020 Unknown    Morbid obesity [E66.01] 04/29/2020 Unknown      Problems Resolved During this Admission:    Diagnosis Date Noted Date Resolved POA    PRINCIPAL PROBLEM:  Chest pain [R07.9] 04/29/2020 05/01/2020 Yes       Discharged Condition: good    Disposition: Home or Self Care    Follow Up:  Follow-up Information     Nadira Ortiz NP In 3 days.    Specialty:  Urgent Care  Why:  Diabetes education  Contact information:  [address]  [place] [address] [phone]              Toy De Los Santos MD In 2 months.    Specialty:  Family Medicine  Contact information:  [address] [facility] [address] [phone]                 Patient Instructions:      Diet diabetic     Activity as tolerated       Significant Diagnostic Studies:   Results for orders placed or performed during the hospital encounter of 04/29/20   CBC auto differential   Result Value Ref Range    WBC 5.49 3.90 - 12.70 K/uL    RBC 5.39 4.60 - 6.20 M/uL    Hemoglobin 15.4 14.0 - 18.0 g/dL    Hematocrit 45.4 40.0 - 54.0 %    Mean Corpuscular Volume 84 82 - 98 fL    Mean Corpuscular Hemoglobin 28.6 27.0 - 31.0 pg    Mean Corpuscular Hemoglobin Conc 33.9 32.0 - 36.0 g/dL    RDW 12.1 11.5 - 14.5 %    Platelets 237 150 - 350 K/uL    MPV 10.2 9.2 - 12.9 fL    Immature Granulocytes 0.2 0.0 - 0.5 %    Gran # (ANC) 2.9 1.8 - 7.7 K/uL    Immature Grans (Abs) 0.01 0.00 - 0.04 K/uL    Lymph # 1.9 1.0 - 4.8 K/uL    Mono # 0.5 0.3 - 1.0 K/uL    Eos # 0.1 0.0 - 0.5 K/uL    Baso # 0.03 0.00 - 0.20 K/uL    nRBC 0 0 /100 WBC    Gran% 53.3 38.0 - 73.0 %    Lymph% 34.8 18.0 - 48.0 %    Mono% 8.6 4.0 - 15.0 %    Eosinophil% 2.6 0.0 - 8.0 %    Basophil% 0.5 0.0 - 1.9 %    Differential Method Automated    Comprehensive metabolic panel   Result Value Ref Range    Sodium 134 (L) 136 - 145 mmol/L    Potassium 4.7 3.5 - 5.1 mmol/L    Chloride 93 (L) 95 - 110 mmol/L    CO2 27 23 - 29 mmol/L    Glucose 577 (HH) 70 - 110 mg/dL    BUN, Bld 19 6 - 20 mg/dL    Creatinine 1.4 0.5 - 1.4 mg/dL    Calcium 10.4 8.7 - 10.5 mg/dL    Total Protein 7.6 6.0 - 8.4 g/dL    Albumin 4.2 3.5 - 5.2 g/dL    Total Bilirubin 1.0 0.1 - 1.0 mg/dL    Alkaline Phosphatase 117 55 - 135 U/L    AST 15 10 - 40 U/L    ALT 29 10 - 44 U/L    Anion Gap 14 8 - 16 mmol/L    eGFR if African American >60 >60 mL/min/1.73 m^2    eGFR if non African American 57 (A) >60 mL/min/1.73 m^2   Troponin I #1   Result Value Ref Range    Troponin I 0.023 0.000 - 0.026 ng/mL   B-Type  natriuretic peptide (BNP)   Result Value Ref Range    BNP <10 0 - 99 pg/mL   COVID-19 Routine Screening   Result Value Ref Range    SARS-CoV-2 RNA, Amplification, Qual Negative Negative   Troponin I #2   Result Value Ref Range    Troponin I 0.017 0.000 - 0.026 ng/mL   Beta - Hydroxybutyrate, Serum   Result Value Ref Range    Beta-Hydroxybutyrate 1.3 (H) 0.0 - 0.5 mmol/L   Lipid panel   Result Value Ref Range    Cholesterol 217 (H) 120 - 199 mg/dL    Triglycerides 352 (H) 30 - 150 mg/dL    HDL 36 (L) 40 - 75 mg/dL    LDL Cholesterol 110.6 63.0 - 159.0 mg/dL    Hdl/Cholesterol Ratio 16.6 (L) 20.0 - 50.0 %    Total Cholesterol/HDL Ratio 6.0 (H) 2.0 - 5.0    Non-HDL Cholesterol 181 mg/dL   Troponin I   Result Value Ref Range    Troponin I 0.023 0.000 - 0.026 ng/mL   Hemoglobin A1c   Result Value Ref Range    Hemoglobin A1C 10.3 (H) 4.0 - 5.6 %    Estimated Avg Glucose 249 (H) 68 - 131 mg/dL   Troponin I   Result Value Ref Range    Troponin I 0.014 0.000 - 0.026 ng/mL   Basic Metabolic Panel (BMP)   Result Value Ref Range    Sodium 136 136 - 145 mmol/L    Potassium 4.9 3.5 - 5.1 mmol/L    Chloride 98 95 - 110 mmol/L    CO2 27 23 - 29 mmol/L    Glucose 431 (H) 70 - 110 mg/dL    BUN, Bld 17 6 - 20 mg/dL    Creatinine 1.1 0.5 - 1.4 mg/dL    Calcium 9.7 8.7 - 10.5 mg/dL    Anion Gap 11 8 - 16 mmol/L    eGFR if African American >60 >60 mL/min/1.73 m^2    eGFR if non African American >60 >60 mL/min/1.73 m^2   CBC with Automated Differential   Result Value Ref Range    WBC 5.27 3.90 - 12.70 K/uL    RBC 4.96 4.60 - 6.20 M/uL    Hemoglobin 14.4 14.0 - 18.0 g/dL    Hematocrit 43.0 40.0 - 54.0 %    Mean Corpuscular Volume 87 82 - 98 fL    Mean Corpuscular Hemoglobin 29.0 27.0 - 31.0 pg    Mean Corpuscular Hemoglobin Conc 33.5 32.0 - 36.0 g/dL    RDW 12.2 11.5 - 14.5 %    Platelets 217 150 - 350 K/uL    MPV 10.7 9.2 - 12.9 fL    Immature Granulocytes 0.4 0.0 - 0.5 %    Gran # (ANC) 2.4 1.8 - 7.7 K/uL    Immature Grans (Abs) 0.02  0.00 - 0.04 K/uL    Lymph # 2.3 1.0 - 4.8 K/uL    Mono # 0.4 0.3 - 1.0 K/uL    Eos # 0.1 0.0 - 0.5 K/uL    Baso # 0.04 0.00 - 0.20 K/uL    nRBC 0 0 /100 WBC    Gran% 44.5 38.0 - 73.0 %    Lymph% 43.3 18.0 - 48.0 %    Mono% 8.3 4.0 - 15.0 %    Eosinophil% 2.7 0.0 - 8.0 %    Basophil% 0.8 0.0 - 1.9 %    Differential Method Automated    Troponin I   Result Value Ref Range    Troponin I 0.016 0.000 - 0.026 ng/mL   D dimer, quantitative   Result Value Ref Range    D-Dimer 0.21 <0.50 mg/L FEU   Basic Metabolic Panel (BMP)   Result Value Ref Range    Sodium 138 136 - 145 mmol/L    Potassium 4.7 3.5 - 5.1 mmol/L    Chloride 98 95 - 110 mmol/L    CO2 29 23 - 29 mmol/L    Glucose 397 (H) 70 - 110 mg/dL    BUN, Bld 16 6 - 20 mg/dL    Creatinine 1.0 0.5 - 1.4 mg/dL    Calcium 9.8 8.7 - 10.5 mg/dL    Anion Gap 11 8 - 16 mmol/L    eGFR if African American >60 >60 mL/min/1.73 m^2    eGFR if non African American >60 >60 mL/min/1.73 m^2   CBC with Automated Differential   Result Value Ref Range    WBC 5.32 3.90 - 12.70 K/uL    RBC 4.99 4.60 - 6.20 M/uL    Hemoglobin 14.3 14.0 - 18.0 g/dL    Hematocrit 43.8 40.0 - 54.0 %    Mean Corpuscular Volume 88 82 - 98 fL    Mean Corpuscular Hemoglobin 28.7 27.0 - 31.0 pg    Mean Corpuscular Hemoglobin Conc 32.6 32.0 - 36.0 g/dL    RDW 12.1 11.5 - 14.5 %    Platelets 220 150 - 350 K/uL    MPV 10.5 9.2 - 12.9 fL    Immature Granulocytes 0.6 (H) 0.0 - 0.5 %    Gran # (ANC) 2.1 1.8 - 7.7 K/uL    Immature Grans (Abs) 0.03 0.00 - 0.04 K/uL    Lymph # 2.6 1.0 - 4.8 K/uL    Mono # 0.5 0.3 - 1.0 K/uL    Eos # 0.2 0.0 - 0.5 K/uL    Baso # 0.03 0.00 - 0.20 K/uL    nRBC 0 0 /100 WBC    Gran% 38.5 38.0 - 73.0 %    Lymph% 47.9 18.0 - 48.0 %    Mono% 9.0 4.0 - 15.0 %    Eosinophil% 3.4 0.0 - 8.0 %    Basophil% 0.6 0.0 - 1.9 %    Differential Method Automated    Nuclear Stress - Cardiology Interpreted   Result Value Ref Range    Target  bpm    HR at rest 81.0 bpm    Systolic blood pressure 123.0 mmHg     Diastolic blood pressure 75.0 mmHg    RPP 9,963     Peak .0 bpm    Peak Systolic .0 mmHg    Peak Diatolic BP 75.0 mmHg    Peak RPP 13,407     85% Max Predicted      % Max HR Achieved 65     Max Predicted      [facility] CV CPX PATIENT IS MALE 1     [facility] CV CPX PATIENT IS FEMALE 0     Nuc Stress EF 57 %    Nuc Rest Diastolic Volume Index 98     Nuc Rest Systolic Volume Index 42    Echo Color Flow Doppler? Yes   Result Value Ref Range    Ascending aorta 3.19 cm    STJ 3.38 cm    AV mean gradient 4 mmHg    Ao peak hany 1.28 m/s    Ao VTI 20.31 cm    IVRT 82.78 msec    IVS 1.15 (A) 0.6 - 1.1 cm    LA size 3.75 cm    Left Atrium Major Axis 4.15 cm    Left Atrium Minor Axis 3.85 cm    LVIDD 5.20 3.5 - 6.0 cm    LVIDS 3.55 2.1 - 4.0 cm    LVOT diameter 2.03 cm    LVOT peak VTI 19.20 cm    PW 1.13 (A) 0.6 - 1.1 cm    MV Peak A Hany 0.55 m/s    E wave decelartion time 220.64 msec    MV Peak E Hany 0.69 m/s    RA Major Axis 3.01 cm    Sinus 3.39 cm    TAPSE 1.98 cm    TR Max Hany 2.23 m/s    TDI LATERAL 0.12 m/s    TDI SEPTAL 0.08 m/s    LV Diastolic Volume 129.48 mL    LV Systolic Volume 52.55 mL    LVOT peak hany 0.99 m/s    LV LATERAL E/E' RATIO 5.75 m/s    LV SEPTAL E/E' RATIO 8.63 m/s    FS 32 %    LV mass 231.81 g    Left Ventricle Relative Wall Thickness 0.43 cm    AV valve area 3.06 cm2    AV Velocity Ratio 0.77     AV index (prosthetic) 0.95     E/A ratio 1.25     Mean e' 0.10 m/s    LVOT area 3.2 cm2    LVOT stroke volume 62.11 cm3    AV peak gradient 7 mmHg    E/E' ratio 6.90 m/s    LV Systolic Volume Index 20.7 mL/m2    LV Diastolic Volume Index 50.94 mL/m2    LV Mass Index 91 g/m2    Triscuspid Valve Regurgitation Peak Gradient 20 mmHg    BSA 2.67 m2   POCT glucose   Result Value Ref Range    POCT Glucose 479 (HH) 70 - 110 mg/dL   POCT glucose   Result Value Ref Range    POCT Glucose 372 (H) 70 - 110 mg/dL   ISTAT PROCEDURE   Result Value Ref Range    POC PH 7.330 (L) 7.35 - 7.45    POC PCO2 63.8 (H)  35 - 45 mmHg    POC PO2 21 (LL) 40 - 60 mmHg    POC HCO3 33.6 (H) 24 - 28 mmol/L    POC BE 8 -2 to 2 mmol/L    POC SATURATED O2 30 (L) 95 - 100 %    Sample VENOUS     Site Other     Allens Test N/A     DelSys Room Air     Mode SPONT     FiO2 21    POCT glucose   Result Value Ref Range    POCT Glucose 385 (H) 70 - 110 mg/dL   POCT glucose   Result Value Ref Range    POCT Glucose 365 (H) 70 - 110 mg/dL   POCT glucose   Result Value Ref Range    POCT Glucose 394 (H) 70 - 110 mg/dL   POCT glucose   Result Value Ref Range    POCT Glucose 357 (H) 70 - 110 mg/dL   POCT glucose   Result Value Ref Range    POCT Glucose 449 (H) 70 - 110 mg/dL   POCT glucose   Result Value Ref Range    POCT Glucose 348 (H) 70 - 110 mg/dL         Pending Diagnostic Studies:     None         Medications:  Reconciled Home Medications:      Medication List      START taking these medications    atorvastatin 20 MG tablet  Commonly known as:  LIPITOR  Take 1 tablet (20 mg total) by mouth once daily.     insulin glargine 100 units/mL (3mL) SubQ pen  Commonly known as:  LANTUS SOLOSTAR U-100 INSULIN  Inject 20 Units into the skin once daily.     metFORMIN 500 MG tablet  Commonly known as:  GLUCOPHAGE  Take 1 tablet (500 mg total) by mouth 2 (two) times daily with meals.        CONTINUE taking these medications    olmesartan-hydrochlorothiazide 40-25 mg per tablet  Commonly known as:  BENICAR HCT  Take 1 tablet by mouth once daily.        STOP taking these medications    desloratadine 5 mg tablet  Commonly known as:  CLARINEX            Indwelling Lines/Drains at time of discharge:   Lines/Drains/Airways     None                 Time spent on the discharge of patient: 30 minutes  Patient was seen and examined on the date of discharge and determined to be suitable for discharge.         Toy De Los Santos MD  Department of Hospital Medicine  [facility]

## 2020-05-01 NOTE — DISCHARGE INSTRUCTIONS
Atorvastatin tablets  What is this medicine?  ATORVASTATIN (a TORE va sta tin) is known as a HMG-CoA reductase inhibitor or 'statin'. It lowers the level of cholesterol and triglycerides in the blood. This drug may also reduce the risk of heart attack, stroke, or other health problems in patients with risk factors for heart disease. Diet and lifestyle changes are often used with this drug.  How should I use this medicine?  Take this medicine by mouth with a glass of water. Follow the directions on the prescription label. You can take this medicine with or without food. Take your doses at regular intervals. Do not take your medicine more often than directed.  Talk to your pediatrician regarding the use of this medicine in children. While this drug may be prescribed for children as young as 10 years old for selected conditions, precautions do apply.  What side effects may I notice from receiving this medicine?  Side effects that you should report to your doctor or health care professional as soon as possible:  · allergic reactions like skin rash, itching or hives, swelling of the face, lips, or tongue  · dark urine  · fever  · joint pain  · muscle cramps, pain  · redness, blistering, peeling or loosening of the skin, including inside the mouth  · trouble passing urine or change in the amount of urine  · unusually weak or tired  · yellowing of eyes or skin  Side effects that usually do not require medical attention (report to your doctor or health care professional if they continue or are bothersome):  · constipation  · heartburn  · stomach gas, pain, upset  What may interact with this medicine?  Do not take this medicine with any of the following medications:  · red yeast rice  · telaprevir  · telithromycin  · voriconazole  This medicine may also interact with the following medications:  · alcohol  · antiviral medicines for HIV or AIDS  · boceprevir  · certain antibiotics like clarithromycin, erythromycin,  troleandomycin  · certain medicines for cholesterol like fenofibrate or gemfibrozil  · cimetidine  · clarithromycin  · colchicine  · cyclosporine  · digoxin  · female hormones, like estrogens or progestins and birth control pills  · grapefruit juice  · medicines for fungal infections like fluconazole, itraconazole, ketoconazole  · niacin  · rifampin  · spironolactone  What if I miss a dose?  If you miss a dose, take it as soon as you can. If it is almost time for your next dose, take only that dose. Do not take double or extra doses.  Where should I keep my medicine?  Keep out of the reach of children.  Store at room temperature between 20 to 25 degrees C (68 to 77 degrees F). Throw away any unused medicine after the expiration date.  What should I tell my health care provider before I take this medicine?  They need to know if you have any of these conditions:  · frequently drink alcoholic beverages  · history of stroke, TIA  · kidney disease  · liver disease  · muscle aches or weakness  · other medical condition  · an unusual or allergic reaction to atorvastatin, other medicines, foods, dyes, or preservatives  · pregnant or trying to get pregnant  · breast-feeding  What should I watch for while using this medicine?  Visit your doctor or health care professional for regular check-ups. You may need regular tests to make sure your liver is working properly.  Tell your doctor or health care professional right away if you get any unexplained muscle pain, tenderness, or weakness, especially if you also have a fever and tiredness. Your doctor or health care professional may tell you to stop taking this medicine if you develop muscle problems. If your muscle problems do not go away after stopping this medicine, contact your health care professional.  This drug is only part of a total heart-health program. Your doctor or a dietician can suggest a low-cholesterol and low-fat diet to help. Avoid alcohol and smoking, and keep  a proper exercise schedule.  Do not use this drug if you are pregnant or breast-feeding. Serious side effects to an unborn child or to an infant are possible. Talk to your doctor or pharmacist for more information.  This medicine may affect blood sugar levels. If you have diabetes, check with your doctor or health care professional before you change your diet or the dose of your diabetic medicine.  If you are going to have surgery tell your health care professional that you are taking this drug.  NOTE:This sheet is a summary. It may not cover all possible information. If you have questions about this medicine, talk to your doctor, pharmacist, or health care provider. Copyright© 2017 [employer]        Metformin extended-release tablets  What is this medicine?  METFORMIN (met FOR min) is used to treat type 2 diabetes. It helps to control blood sugar. Treatment is combined with diet and exercise. This medicine can be used alone or with other medicines for diabetes.  How should I use this medicine?  Take this medicine by mouth with a glass of water. Take it with meals. Swallow whole, do not crush or chew. Follow the directions on the prescription label. Take your medicine at regular intervals. Do not take your medicine more often than directed.  Talk to your pediatrician regarding the use of this medicine in children. Special care may be needed.  What side effects may I notice from receiving this medicine?  Side effects that you should report to your doctor or health care professional as soon as possible:  · allergic reactions like skin rash, itching or hives, swelling of the face, lips, or tongue  · breathing problems  · feeling faint or lightheaded, falls  · muscle aches or pains  · signs and symptoms of low blood sugar such as feeling anxious, confusion, dizziness, increased hunger, unusually weak or tired, sweating, shakiness, cold, irritable, headache, blurred vision, fast heartbeat, loss of  consciousness  · slow or irregular heartbeat  · unusual stomach pain or discomfort  · unusually tired or weak  Side effects that usually do not require medical attention (report to your doctor or health care professional if they continue or are bothersome):  · diarrhea  · headache  · heartburn  · metallic taste in mouth  · nausea  · stomach gas, upset  What may interact with this medicine?  Do not take this medicine with any of the following medications:  · dofetilide  · gatifloxacin  · certain contrast medicines given before X-rays, CT scans, MRI, or other procedures  This medicine may also interact with the following medications:  · acetazolamide  · certain medicines for HIV infection or hepatitis, like adefovir, emtricitabine, entecavir, lamivudine, or tenofovir  · cimetidine  · crizotinib  · digoxin  · diuretics  · female hormones, like estrogens or progestins and birth control pills  · glycopyrrolate  · isoniazid  · lamotrigine  · medicines for blood pressure, heart disease, irregular heart beat  · memantine  · midodrine  · methazolamide  · morphine  · nicotinic acid  · phenothiazines like chlorpromazine, mesoridazine, prochlorperazine, thioridazine  · phenytoin  · procainamide  · propantheline  · quinidine  · quinine  · ranitidine  · ranolazine  · steroid medicines like prednisone or cortisone  · stimulant medicines for attention disorders, weight loss, or to stay awake  · thyroid medicines  · topiramate  · trimethoprim  · trospium  · vancomycin  · vandetanib  · zonisamide  What if I miss a dose?  If you miss a dose, take it as soon as you can. If it is almost time for your next dose, take only that dose. Do not take double or extra doses.  Where should I keep my medicine?  Keep out of the reach of children.  Store at room temperature between 15 and 30 degrees C (59 and 86 degrees F). Protect from light. Throw away any unused medicine after the expiration date.  What should I tell my health care provider  before I take this medicine?  They need to know if you have any of these conditions:  · anemia  · frequently drink alcohol-containing beverages  · become easily dehydrated  · heart attack  · heart failure  · kidney disease  · liver disease  · polycystic ovary syndrome  · serious infection or injury  · vomiting  · an unusual or allergic reaction to metformin, other medicines, foods, dyes, or preservatives  · pregnant or trying to get pregnant  · breast-feeding  What should I watch for while using this medicine?  Visit your doctor or health care professional for regular checks on your progress.  A test called the HbA1C (A1C) will be monitored. This is a simple blood test. It measures your blood sugar control over the last 2 to 3 months. You will receive this test every 3 to 6 months.  Learn how to check your blood sugar. Learn the symptoms of low and high blood sugar and how to manage them.  Always carry a quick-source of sugar with you in case you have symptoms of low blood sugar. Examples include hard sugar candy or glucose tablets. Make sure others know that you can choke if you eat or drink when you develop serious symptoms of low blood sugar, such as seizures or unconsciousness. They must get medical help at once.  Tell your doctor or health care professional if you have high blood sugar. You might need to change the dose of your medicine. If you are sick or exercising more than usual, you might need to change the dose of your medicine.  Do not skip meals. Ask your doctor or health care professional if you should avoid alcohol. Many nonprescription cough and cold products contain sugar or alcohol. These can affect blood sugar.  This medicine may cause ovulation in premenopausal women who do not have regular monthly periods. This may increase your chances of becoming pregnant. You should not take this medicine if you become pregnant or think you may be pregnant. Talk with your doctor or health care professional  about your birth control options while taking this medicine. Contact your doctor or health care professional right away if think you are pregnant.  The tablet shell for some brands of this medicine does not dissolve. This is normal. The tablet shell may appear whole in the stool. This is not a cause for concern.  If you are going to need surgery, a MRI, CT scan, or other procedure, tell your doctor that you are taking this medicine. You may need to stop taking this medicine before the procedure.  Wear a medical ID bracelet or chain, and carry a card that describes your disease and details of your medicine and dosage times.  NOTE:This sheet is a summary. It may not cover all possible information. If you have questions about this medicine, talk to your doctor, pharmacist, or health care provider. Copyright© 2017 [employer]        Discharge Instructions: Using Injection Pens    Your healthcare provider has prescribed a medicine that you can give yourself using an injection pen. One medicine that is commonly given with an injection pen is insulin. Injection pens are popular because they are easy to use. Also, many people like how pens look better than syringes.  Injection pens can be disposable or nondisposable:  · Disposable pens come already filled (prefilled) with a set amount of medicine. Once you inject the medicine, you throw the pen away.  · With nondisposable pens, you replace the medicine cartridge when it is empty.  Both types of pens need a pen needle. This is screwed onto the tip of the pen before each injection. Pen needles come in different lengths and thicknesses. Always throw away needles right after you use them. Never reuse needles.  Step 1. Gather your supplies  · Alcohol swabs  · Injector pen  · Pen needle  · Cartridge if pen is the nondisposable type  · Special container to throw out the used needles and disposable pens (sharps container). You can buy a sharps container at a drugstore or medical  supply store. You can also use an empty laundry detergent bottle, or any other puncture-proof container and lid.  Step 2. Prepare the pen  Each pen will come with its own special instructions. Read the directions that came with your pen. Discuss the instructions with your diabetes care team or diabetes educator before injecting insulin. In general here is what to do:  · Wash your hands.  · Remove the pen cap.  · Check the medicine. Make sure it is the type your provider prescribed. Check that it has not . Also check that it's not discolored, frosted, or lumpy. If the medicine doesn't look right to you, dont use it. Get a new cartridge or a new disposable pen. Never share injection pens or medicine cartridges.  · Some medicines need to be mixed. You can do this by rolling the pen between your palms about 20 times. You can also tip the pen back and forth.  · Attach a needle to your pen. Read the directions that came with your pen. They will give you the steps for attaching a needle. If youre using a nondisposable pen, dont leave the needle attached to the pen between shots.  Step 3. Prime the pen and set the dose  Prime your pen and make sure that it's working by doing a trial shot in the air before actually injecting your medicine. Then set the dose.  · Dial the pen to give 2 or 3 units of medicine.  · Hold the pen like a pencil, with the needle pointing up.  · Tap the barrel of the pen. This will make sure that any air bubbles in the cartridge float to the top of the cartridge.  · Push down firmly on the pen's injector button. This will send medicine into the air. You should see a couple of drops of medicine come out of the needle. If nothing comes out, try doing another air shot. If medicine still doesn't come out after a second try, your pen may be low on medicine. Or the needle may not be connected properly. Look at the troubleshooting tips in the directions that came with your pen.  · Set your dose.  Dial the pen to give the amount of medicine you need to take. As you turn the dial, you should hear a clicking sound. Your pen is now ready to use.  Step 4. Inject your medicine  · Choose an injection site. The belly (abdomen), upper arms, thighs, and buttocks are the most common sites to use. Don't use sites that are close to a mole or scar. Make sure sites are more than 2 inches away from your belly button.  · Make sure the site is clean. Clean it with an alcohol swab. Let it dry.  · Pinch up a fold of skin around the site you've picked. Hold it firmly with one hand.  · In your other hand, hold the injection pen like a pencil.  · Put the needle straight into the pinched-up skin. Thin adults or children may need to inject the needle at a slight angle. Your healthcare provider will show you what is best for you.  · Make sure the needle gets all the way into the fatty tissue below the skin.  · Push the pen injection button. Unless you take a very small dose, the injection should take a couple of seconds. You may have to hold the pen in 5 to 10 seconds after injecting the insulin. This will depend on the insulin pen you are using. Carefully follow the instructions that came with your pen. Or follow the advice your diabetes care team or diabetes educator gives you.  · Let go of the skin and remove the needle from your skin.  Step 5. After the injection  · If you are using a nondisposable pen, remove the needle by unscrewing it.  · Put any used needles or disposable pens into the sharps container. Make sure that needles point down. Never put your fingers into the container.  · When the sharps container is full, take it back to your healthcare facility. The staff will get rid of it for you.  Follow-up care  Follow up with your healthcare provider, or as advised.     When to seek medical care   Call your healthcare provider right away if you have any of the following:  · Problems that stop you from giving your  injection  · Bleeding at the injection site for more than 10 minutes  · Pain at the injection site that does not go away  · Accidental or improper injection, such as:  ¨ Injecting the medicine in the wrong area  ¨ Injecting too much medicine  · Rash at the injection site  · Fever of 100.4°F (38°C) or higher, or as directed by your healthcare provider  · Redness, warmth, swelling, or drainage at the injection site  · Signs of allergic reaction. These include trouble breathing, hives, or rash.   Date Last Reviewed: 7/1/2016 © 2000-2017 [employer]. [address] [place] [id number]. All rights reserved. This information is not intended as a substitute for professional medical care. Always follow your healthcare professional's instructions.        Diet: Diabetes  Food is an important tool that you can use to control diabetes and stay healthy. Eating well-balanced meals in the correct amounts will help you control your blood glucose levels and prevent low blood sugar reactions. It will also help you reduce the health risks of diabetes. There is no one specific diet that is right for everyone with diabetes. But there are general guidelines to follow. A registered dietitian (RD) will create a tailored diet approach thats just right for you. He or she will also help you plan healthy meals and snacks. If you have any questions, call your dietitian for advice.     Guidelines for success  Talk with your healthcare provider before starting a diabetes diet or weight loss program. If you haven't talked with a dietitian yet, ask your provider for a referral. The following guidelines can help you succeed:  · Select foods from the 6 food groups below. Your dietitian will help you find food choices within each group. He or she will also show you serving sizes and how many servings you can have at each meal.  ¨ Grains, beans, and starchy vegetables  ¨ Vegetables  ¨ Fruit  ¨ Milk or yogurt  ¨ Meat, poultry,  fish, or tofu  ¨ Healthy fats  · Check your blood sugar levels as directed by your provider. Take any medicine as prescribed by your provider.  · Learn to read food labels and pick the right portion sizes.  · Eat only the amount of food in your meal plan. Eat about the same amount of food at regular times each day. Dont skip meals. Eat meals 4 to 5 hours apart, with snacks in between.  · Limit alcohol. It raises blood sugar levels. Drink water or calorie-free diet drinks that use safe sweeteners.  · Eat less fat to help lower your risk of heart disease. Use nonfat or low-fat dairy products and lean meats. Avoid fried foods. Use cooking oils that are unsaturated, such as olive, canola, or peanut oil.  · Talk with your dietitian about safe sugar substitutes.  · Avoid added salt. It can contribute to high blood pressure, which can cause heart disease. People with diabetes already have a risk of high blood pressure and heart disease.  · Stay at a healthy weight. If you need to lose weight, cut down on your portion sizes. But dont skip meals. Exercise is an important part of any weight management program. Talk with your provider about an exercise program thats right for you.  · For more information about the best diet plan for you, talk with a registered dietitian (RD). To find an RD in your area, contact:  ¨ Academy of Nutrition and Dietetics www.eatright.org  ¨ The American Diabetes Association [phone] www.diabetes.org  Date Last Reviewed: 8/1/2016  © 1139-3627 [employer]. [address] [place] [address], [place] [address]. All rights reserved. This information is not intended as a substitute for professional medical care. Always follow your healthcare professional's instructions.

## 2020-05-01 NOTE — PLAN OF CARE
Fall precautions implemented. Pt remained free from falls/injuries.  Turning and repositioning independently. Pt extremely eager about controlling blood sugars.  CBG >500 during the night. Initiated first dose of long acting insulin. Blood glucose monitored using SSI. Safety precautions implemented. Chart reviewed. Will continue to monitor.

## 2020-05-01 NOTE — PROGRESS NOTES
Attempted follow up with Keith Jose Angel today however he was already discharge. Per primary nurse, patient was able to demonstrate self administering insulin properly and had no further questions.

## 2020-05-05 ENCOUNTER — PATIENT MESSAGE (OUTPATIENT)
Dept: DIABETES | Facility: CLINIC | Age: 50
End: 2020-05-05

## 2020-05-05 ENCOUNTER — OFFICE VISIT (OUTPATIENT)
Dept: DIABETES | Facility: CLINIC | Age: 50
End: 2020-05-05
Payer: COMMERCIAL

## 2020-05-05 DIAGNOSIS — E11.69 TYPE 2 DIABETES MELLITUS WITH OTHER SPECIFIED COMPLICATION, UNSPECIFIED WHETHER LONG TERM INSULIN USE: Primary | ICD-10-CM

## 2020-05-05 PROCEDURE — 99204 PR OFFICE/OUTPT VISIT, NEW, LEVL IV, 45-59 MIN: ICD-10-PCS | Mod: 95,,, | Performed by: NURSE PRACTITIONER

## 2020-05-05 PROCEDURE — 3046F PR MOST RECENT HEMOGLOBIN A1C LEVEL > 9.0%: ICD-10-PCS | Mod: CPTII,,, | Performed by: NURSE PRACTITIONER

## 2020-05-05 PROCEDURE — 99204 OFFICE O/P NEW MOD 45 MIN: CPT | Mod: 95,,, | Performed by: NURSE PRACTITIONER

## 2020-05-05 PROCEDURE — 3046F HEMOGLOBIN A1C LEVEL >9.0%: CPT | Mod: CPTII,,, | Performed by: NURSE PRACTITIONER

## 2020-05-05 RX ORDER — INSULIN GLARGINE 100 [IU]/ML
26 INJECTION, SOLUTION SUBCUTANEOUS DAILY
Qty: 15 ML | Refills: 0
Start: 2020-05-05 | End: 2021-09-07

## 2020-05-05 NOTE — PROGRESS NOTES
Subjective:         Patient ID: Satnam Walter is a 50 y.o. male.  Patient's current PCP is Pro Levine MD.       Chief Complaint: No chief complaint on file.    HPI  Satnam Walter is a 50 y.o. Black or  male presenting for a new consult for diabetes.  Patient here for a new diagnosis of DM and has the following complications related to diabetes:  HTN, Hyperlipidemia, peripheral neuropathy. He was seen in the ER on 4/29 for blurred vision, fatigue, numbness in hands. Started on Metformin and Lantus- tolerating well. Past failed treatment include: none.  Blood glucose testing is performed regularly. Fasting and 2 hour PP > 300.  The patient reports medication compliance all of the time and diet needs improvement. He  denies hypoglycemia.    The patient location is: home  The chief complaint leading to consultation is: DM consult  Visit type: audiovisual  Total time spent with patient: 45 min  Each patient to whom he or she provides medical services by telemedicine is:  (1) informed of the relationship between the physician and patient and the respective role of any other health care provider with respect to management of the patient; and (2) notified that he or she may decline to receive medical services by telemedicine and may withdraw from such care at any time.    Notes: see below         //   , There is no height or weight on file to calculate BMI.  His blood sugar in clinic today is: No results found for: POCGLU    Labs reviewed and are noted below.  His most recent A1C is:  Lab Results   Component Value Date    HGBA1C 10.3 (H) 04/29/2020     No results found for: CPEPTIDE  No results found for: GLUTAMICACID  Glucose   Date Value Ref Range Status   05/01/2020 397 (H) 70 - 110 mg/dL Final     Anion Gap   Date Value Ref Range Status   05/01/2020 11 8 - 16 mmol/L Final     eGFR if    Date Value Ref Range Status   05/01/2020 >60 >60 mL/min/1.73 m^2 Final     eGFR if non     Date Value Ref Range Status   05/01/2020 >60 >60 mL/min/1.73 m^2 Final     Comment:     Calculation used to obtain the estimated glomerular filtration  rate (eGFR) is the CKD-EPI equation.            CURRENT DM MEDICATIONS:   Diabetes Medications             insulin (LANTUS SOLOSTAR U-100 INSULIN) glargine 100 units/mL (3mL) SubQ pen Inject 20 Units into the skin once daily.    metFORMIN (GLUCOPHAGE) 500 MG tablet Take 1 tablet (500 mg total) by mouth 2 (two) times daily with meals.        Diabetes Management Status    Statin: Taking  ACE/ARB: Taking    Screening or Prevention Patient's value Goal Complete/Controlled?   HgA1C Testing and Control   Lab Results   Component Value Date    HGBA1C 10.3 (H) 04/29/2020      Annually/Less than 8% No   Lipid profile : 04/29/2020 Annually Yes   LDL control Lab Results   Component Value Date    LDLCALC 110.6 04/29/2020    Annually/Less than 100 mg/dl  No   Nephropathy screening No results found for: LABMICR  No results found for: PROTEINUA Annually No   Blood pressure BP Readings from Last 1 Encounters:   05/01/20 109/71    Less than 140/90 Yes   Dilated retinal exam Most Recent Eye Exam Date: Not Found Annually No   Foot exam   Most Recent Foot Exam Date: Not Found Annually No     LIFESTYLE:  ACTIVITY LEVEL: Sedentary. EXERCISE: none ,   MEAL PLANNING: Patient reports number of meals per day to be 3 and number of snacks per day to be 2  BLOOD GLUCOSE TESTING: Patient is testing 3 times per day       Review of Systems   Constitutional: Positive for fatigue. Negative for activity change and appetite change.   Eyes: Positive for visual disturbance.   Gastrointestinal: Negative for constipation and diarrhea.   Endocrine: Positive for polydipsia, polyphagia and polyuria.   Neurological: Negative for syncope and weakness.   Psychiatric/Behavioral: Negative for confusion.         Objective:      Physical Exam   Constitutional: He is oriented to person,  place, and time. He appears well-developed and well-nourished. No distress.   Neurological: He is alert and oriented to person, place, and time.   Skin: He is not diaphoretic.   Psychiatric: His speech is normal.       Assessment:       1. Type 2 diabetes mellitus with other specified complication, unspecified whether long term insulin use        Plan:   Type 2 diabetes mellitus with other specified complication, unspecified whether long term insulin use  -     Microalbumin/creatinine urine ratio; Future; Expected date: 07/29/2020  -     Hemoglobin A1C; Future; Expected date: 07/29/2020  -     insulin (LANTUS SOLOSTAR U-100 INSULIN) glargine 100 units/mL (3mL) SubQ pen; Inject 26 Units into the skin once daily.  Dispense: 15 mL; Refill: 0        PLAN:   - Condition: uncontrolled    - Monitor blood glucose 2x daily. Goals reviewed  - Diet reviewed, exercise encouraged, download Gamma Basics pal, try meal preps   - Medication Changes:  Continue Metformin, increase Lantus to 26 units. Increase 2 units every 3 days until fasting at goal  - The patient was explained the above plan and given opportunity to ask questions.  He understands, chooses and consents to this plan and accepts all the risks, which include but are not limited to the risks mentioned above.   - Labs ordered as above  - Nurse visit: 1 week (will continue weekly until in clinic visit)  - Follow up: 2 months - will do foot exam at this visit    A total of 45 minutes was spent in face to face time, of which over 50% was spent in counseling patient on disease process, complications, treatment, and side effects of medications.

## 2020-05-06 ENCOUNTER — PATIENT MESSAGE (OUTPATIENT)
Dept: DIABETES | Facility: CLINIC | Age: 50
End: 2020-05-06

## 2020-05-07 ENCOUNTER — TELEPHONE (OUTPATIENT)
Dept: DIABETES | Facility: CLINIC | Age: 50
End: 2020-05-07

## 2020-05-07 NOTE — TELEPHONE ENCOUNTER
Contacted pt regarding high BG, Bg improved, denies symptoms of DKA. Red flags and when to go to ER reviewed. Hydration , activity , diet reviewed. No further questions.

## 2020-05-11 ENCOUNTER — PATIENT MESSAGE (OUTPATIENT)
Dept: DIABETES | Facility: CLINIC | Age: 50
End: 2020-05-11

## 2020-05-13 ENCOUNTER — TELEPHONE (OUTPATIENT)
Dept: DIABETES | Facility: CLINIC | Age: 50
End: 2020-05-13

## 2020-05-13 NOTE — TELEPHONE ENCOUNTER
----- Message from Nadira Ortiz NP sent at 5/12/2020 10:57 PM CDT -----  Schedule a 1 week nurse visit- via [employer]

## 2020-05-14 ENCOUNTER — PATIENT MESSAGE (OUTPATIENT)
Dept: DIABETES | Facility: CLINIC | Age: 50
End: 2020-05-14

## 2020-05-14 DIAGNOSIS — E11.69 TYPE 2 DIABETES MELLITUS WITH OTHER SPECIFIED COMPLICATION, UNSPECIFIED WHETHER LONG TERM INSULIN USE: Primary | ICD-10-CM

## 2020-05-18 ENCOUNTER — TELEPHONE (OUTPATIENT)
Dept: DIABETES | Facility: CLINIC | Age: 50
End: 2020-05-18

## 2020-05-18 ENCOUNTER — PATIENT MESSAGE (OUTPATIENT)
Dept: DIABETES | Facility: CLINIC | Age: 50
End: 2020-05-18

## 2020-05-18 ENCOUNTER — CLINICAL SUPPORT (OUTPATIENT)
Dept: DIABETES | Facility: CLINIC | Age: 50
End: 2020-05-18
Payer: COMMERCIAL

## 2020-05-18 ENCOUNTER — OFFICE VISIT (OUTPATIENT)
Dept: OPHTHALMOLOGY | Facility: CLINIC | Age: 50
End: 2020-05-18
Payer: COMMERCIAL

## 2020-05-18 DIAGNOSIS — H52.03 HYPEROPIA, BILATERAL: ICD-10-CM

## 2020-05-18 DIAGNOSIS — E11.69 TYPE 2 DIABETES MELLITUS WITH OTHER SPECIFIED COMPLICATION, UNSPECIFIED WHETHER LONG TERM INSULIN USE: ICD-10-CM

## 2020-05-18 DIAGNOSIS — E11.9 DIABETES MELLITUS TYPE 2 WITHOUT RETINOPATHY: Primary | ICD-10-CM

## 2020-05-18 PROCEDURE — 92004 COMPRE OPH EXAM NEW PT 1/>: CPT | Mod: S$GLB,,, | Performed by: OPTOMETRIST

## 2020-05-18 PROCEDURE — 99999 PR PBB SHADOW E&M-EST. PATIENT-LVL II: ICD-10-PCS | Mod: PBBFAC,,, | Performed by: OPTOMETRIST

## 2020-05-18 PROCEDURE — 99499 UNLISTED E&M SERVICE: CPT | Mod: S$GLB,,, | Performed by: PEDIATRICS

## 2020-05-18 PROCEDURE — 92004 PR EYE EXAM, NEW PATIENT,COMPREHESV: ICD-10-PCS | Mod: S$GLB,,, | Performed by: OPTOMETRIST

## 2020-05-18 PROCEDURE — 92015 PR REFRACTION: ICD-10-PCS | Mod: S$GLB,,, | Performed by: OPTOMETRIST

## 2020-05-18 PROCEDURE — 99999 PR PBB SHADOW E&M-EST. PATIENT-LVL II: CPT | Mod: PBBFAC,,, | Performed by: OPTOMETRIST

## 2020-05-18 PROCEDURE — 99499 NO LOS: ICD-10-PCS | Mod: S$GLB,,, | Performed by: PEDIATRICS

## 2020-05-18 PROCEDURE — 92015 DETERMINE REFRACTIVE STATE: CPT | Mod: S$GLB,,, | Performed by: OPTOMETRIST

## 2020-05-18 NOTE — PROGRESS NOTES
Bg log reviewed, improved. Will continue to monitor. Nurse visit in 2 weeks to review bg again, continue plan

## 2020-05-18 NOTE — TELEPHONE ENCOUNTER
Nurse visit scheduled.   Advise pt to continue plan per provider's request.       ----- Message from Nadira Ortiz NP sent at 5/18/2020  2:39 PM CDT -----   Nurse visit in 2 weeks to review bg again

## 2020-05-18 NOTE — PROGRESS NOTES
HPI     Diabetic Eye Exam      Additional comments: Yearly              Comments     NP to TRF   Patient here today for yearly DM eye exam  Has noticeable changes in vision since last eye exam  Patient states he is experiencing blurred vision since being released from   the hospital  Wears OTC Readers  Last Blood sugar reading this morning 120  Lab Results       Component                Value               Date                       HGBA1C                   10.3 (H)            04/29/2020            1. DM          Last edited by Allison Jo, PCT on 5/18/2020  9:17 AM. (History)              Assessment /Plan     For exam results, see Encounter Report.    Diabetes mellitus type 2 without retinopathy    Type 2 diabetes mellitus with other specified complication, unspecified whether long term insulin use  -     Ambulatory referral/consult to Optometry    Hyperopia, bilateral      No Background Diabetic Retinopathy    Hyperopia due to increased blood sugar    Dispense Final Rx for glasses or may use OTC glasses.  RTC 1 year  Discussed above and answered questions.

## 2020-05-18 NOTE — LETTER
May 18, 2020      Nadira Ortiz NP  [address]  [place] [address]           '[facility]  [address] [place] [id number]  Phone: [phone]  Fax: [phone]          Patient: Satnam Walter   MR Number: [id number]   YOB: 1970   Date of Visit: 5/18/2020       Dear Nadira Ortiz:    Thank you for referring Satnam Walter to me for evaluation. Attached you will find relevant portions of my assessment and plan of care.    If you have questions, please do not hesitate to call me. I look forward to following Satnam Walter along with you.    Sincerely,    Cristo Nunez, OD    Enclosure  CC:  No Recipients    If you would like to receive this communication electronically, please contact [email or web address] or [phone] to request more information on [employer] access.    For providers and/or their staff who would like to refer a patient to [facility], please contact us through our one-stop-shop provider referral line, Alicia Gabriel, at [phone].    If you feel you have received this communication in error or would no longer like to receive these types of communications, please e-mail [email or web address]

## 2020-06-11 ENCOUNTER — OFFICE VISIT (OUTPATIENT)
Dept: DERMATOLOGY | Facility: CLINIC | Age: 50
End: 2020-06-11
Payer: COMMERCIAL

## 2020-06-11 DIAGNOSIS — L82.1 SEBORRHEIC KERATOSIS: ICD-10-CM

## 2020-06-11 DIAGNOSIS — L91.8 ACROCHORDON: Primary | ICD-10-CM

## 2020-06-11 PROCEDURE — 99201 PR OFFICE/OUTPT VISIT,NEW,LEVL I: CPT | Mod: 95,,, | Performed by: DERMATOLOGY

## 2020-06-11 PROCEDURE — 99201 PR OFFICE/OUTPT VISIT,NEW,LEVL I: ICD-10-PCS | Mod: 95,,, | Performed by: DERMATOLOGY

## 2020-06-11 NOTE — PROGRESS NOTES
Subjective:       Patient ID:  Satnam Walter is a 50 y.o. male who presents for No chief complaint on file.    The patient location is: home  The chief complaint leading to consultation is: skin tags    Visit type: audiovisual    Face to Face time with patient: 15 min  15 minutes of total time spent on the encounter, which includes face to face time and non-face to face time preparing to see the patient (eg, review of tests), Obtaining and/or reviewing separately obtained history, Documenting clinical information in the electronic or other health record, Independently interpreting results (not separately reported) and communicating results to the patient/family/caregiver, or Care coordination (not separately reported).         Each patient to whom he or she provides medical services by telemedicine is:  (1) informed of the relationship between the physician and patient and the respective role of any other health care provider with respect to management of the patient; and (2) notified that he or she may decline to receive medical services by telemedicine and may withdraw from such care at any time.    Notes:         Review of Systems   Constitutional: Negative for fever and chills.   Gastrointestinal: Negative for nausea and vomiting.   Skin: Negative for daily sunscreen use, activity-related sunscreen use and recent sunburn.   Hematologic/Lymphatic: Does not bruise/bleed easily.        Objective:    Physical Exam   Constitutional: He appears well-developed and well-nourished. No distress.   Neurological: He is alert and oriented to person, place, and time. He is not disoriented.   Psychiatric: He has a normal mood and affect.   Skin:   Areas Examined (abnormalities noted in diagram):   Head / Face Inspection Performed  Neck Inspection Performed  RUE Inspected  LUE Inspection Performed  Nails and Digits Inspection Performed              Diagram Legend      Pigmented verrucoid papule/plaque c/w seborrheic  keratosis      Pedunculated fleshy papule(s) c/w skin tag(s)     Assessment / Plan:        Acrochordon  Seborrheic keratosis  Discussed dx, AVS given. Will notify derm clinic if wishes to up to 10 removed cosmetically vs. medical management and removal of 1-2 bothersome, larger lesions.            Follow up if symptoms worsen or fail to improve.

## 2020-06-11 NOTE — PATIENT INSTRUCTIONS
Understanding Seborrheic Keratosis  Seborrheic keratoses are wart-like growths on the skin. These growths are not cancer. Many people get them, especially after age 30.  How to say it  [id number]   What causes seborrheic keratoses?  Doctors do not know what causes seborrheic keratoses. They may run in families. In addition, they become more common as people get older.  What are the symptoms of seborrheic keratoses?  Here is what seborrheic keratoses often look like:  · They tend to be round or oval in shape. They can be very small or about as big across as a quarter.  · They can appear singly or in clusters.  · They tend to be tan, brown, or black in color.  · The edges may be scalloped or uneven-looking.  · They can have a waxy or scaly look.  · They can be a bit raised, appearing to be stuck on the skin.  · They may become red and irritated if scratched or rubbed by clothing  Sebhorreic keratoses are not painful, but they may be itchy. They can become irritated if they are continually rubbed by skin or clothing. Seborrheic keratoses most often appear on the face, arms, chest, back, or belly.  How are seborrheic keratoses treated?  Seborrheic keratoses dont need to be treated unless they bother you. You may choose to have them removed because you find them unattractive. You may also want them removed because they get irritated and uncomfortable. A healthcare provider can remove them in an office visit. Ways that seborrheic keratoses can be removed include:  · Freezing them off with liquid nitrogen  · Cutting them off with a scalpel  · Burning them off with electricity  What are the complications of seborrheic keratoses?  Seborrheic keratoses are not cancer, but they can look like some types of skin cancer. So its a good idea to ask your healthcare provider to check any new skin growths. Ask your healthcare provider about any skin problem that concerns you.  When should I call my healthcare  provider?  Call your healthcare provider right away if any of these occur:  · You develop a lot of seborrheic keratoses very quickly  · You have a sore that does not heal within a few weeks, or heals and then comes back  · You have a mole or skin growth that is changing in size, shape, or color  · You have a mole or skin growth that looks different on one side from the other  · You have a mole or skin growth that is not the same color throughout   Date Last Reviewed: 5/1/2016  © 7839-1644 [employer]. [address] [place] [address]. All rights reserved. This information is not intended as a substitute for professional medical care. Always follow your healthcare professional's instructions.

## 2020-07-31 NOTE — PROGRESS NOTES
Subjective:         Patient ID: Satnam Walter is a 50 y.o. male.  Patient's current PCP is Pro Levine MD.       Chief Complaint: Follow-up    HPI  Satnam Walter is a 50 y.o. Black or  male presenting for a follow up for diabetes.  Patient here for a new diagnosis of DM and has the following complications related to diabetes:  HTN, Hyperlipidemia, peripheral neuropathy.  Past failed treatment include: none.  Blood glucose testing is performed regularly. BG have been 100-120. Due to BG control with diet and exercise, pt stopped Metformin and Lantus       //  Weight: (!) 142.3 kg (313 lb 11.4 oz), Body mass index is 45.01 kg/m².  His blood sugar in clinic today is:   Lab Results   Component Value Date    POCGLU 87 08/03/2020       Labs reviewed and are noted below.  His most recent A1C is:  Lab Results   Component Value Date    HGBA1C 10.3 (H) 04/29/2020     No results found for: CPEPTIDE  No results found for: GLUTAMICACID  Glucose   Date Value Ref Range Status   05/01/2020 397 (H) 70 - 110 mg/dL Final     Anion Gap   Date Value Ref Range Status   05/01/2020 11 8 - 16 mmol/L Final     eGFR if    Date Value Ref Range Status   05/01/2020 >60 >60 mL/min/1.73 m^2 Final     eGFR if non    Date Value Ref Range Status   05/01/2020 >60 >60 mL/min/1.73 m^2 Final     Comment:     Calculation used to obtain the estimated glomerular filtration  rate (eGFR) is the CKD-EPI equation.            CURRENT DM MEDICATIONS:   Diabetes Medications             insulin (LANTUS SOLOSTAR U-100 INSULIN) glargine 100 units/mL (3mL) SubQ pen Inject 26 Units into the skin once daily.    metFORMIN (GLUCOPHAGE) 500 MG tablet Take 1 tablet (500 mg total) by mouth 2 (two) times daily with meals.          Diabetes Management Status    Statin: Taking  ACE/ARB: Taking    Screening or Prevention Patient's value Goal Complete/Controlled?   HgA1C Testing and Control   Lab Results   Component Value  Date    HGBA1C 10.3 (H) 04/29/2020      Annually/Less than 8% No   Lipid profile : 04/29/2020 Annually Yes   LDL control Lab Results   Component Value Date    LDLCALC 110.6 04/29/2020    Annually/Less than 100 mg/dl  No   Nephropathy screening No results found for: LABMICR  No results found for: PROTEINUA Annually No   Blood pressure BP Readings from Last 1 Encounters:   08/03/20 (!) 136/91    Less than 140/90 Yes   Dilated retinal exam : 05/18/2020 Annually No   Foot exam   Most Recent Foot Exam Date: Not Found Annually No     LIFESTYLE:  ACTIVITY LEVEL: Very Active.    MEAL PLANNING: Patient reports number of meals per day to be 3 and number of snacks per day to be 2  BLOOD GLUCOSE TESTING: Patient is testing 3 times per day       Review of Systems   Constitutional: Positive for fatigue. Negative for activity change and appetite change.   Eyes: Positive for visual disturbance.   Gastrointestinal: Negative for constipation and diarrhea.   Endocrine: Positive for polydipsia, polyphagia and polyuria.   Neurological: Negative for syncope and weakness.   Psychiatric/Behavioral: Negative for confusion.         Objective:      Physical Exam  Constitutional:       General: He is not in acute distress.     Appearance: He is well-developed. He is not ill-appearing, toxic-appearing or diaphoretic.   Neck:      Musculoskeletal: Normal range of motion.   Cardiovascular:      Rate and Rhythm: Normal rate.      Pulses:           Dorsalis pedis pulses are 2+ on the right side and 2+ on the left side.   Pulmonary:      Effort: Pulmonary effort is normal.   Musculoskeletal: Normal range of motion.   Feet:      Right foot:      Protective Sensation: 6 sites tested. 6 sites sensed.      Skin integrity: No ulcer, blister, skin breakdown, erythema, warmth, callus or dry skin.      Left foot:      Protective Sensation: 6 sites tested. 6 sites sensed.      Skin integrity: No ulcer, blister, skin breakdown, erythema, warmth,  callus or dry skin.   Skin:     General: Skin is warm and dry.   Neurological:      Mental Status: He is alert and oriented to person, place, and time.   Psychiatric:         Speech: Speech normal.         Behavior: Behavior normal.         Thought Content: Thought content normal.         Judgment: Judgment normal.         Assessment:       1. Type 2 diabetes mellitus with other specified complication, unspecified whether long term insulin use        Plan:   Type 2 diabetes mellitus with other specified complication, unspecified whether long term insulin use  -     POCT Glucose, Hand-Held Device  -     Hemoglobin A1C; Future; Expected date: 11/03/2020      PLAN:   - Condition: improved   - Monitor blood glucose 2x daily. Goals reviewed  - Diet reviewed, exercise encouraged, download [employer] pal, try meal preps   - Continue diet and exercise   - The patient was explained the above plan and given opportunity to ask questions.  He understands, chooses and consents to this plan and accepts all the risks, which include but are not limited to the risks mentioned above.   - Labs ordered as above  - Nurse visit: pending labs   - Follow up: 3 months     A total of 30 minutes was spent in face to face time, of which over 50% was spent in counseling patient on disease process, complications, treatment, and side effects of medications.

## 2020-08-03 ENCOUNTER — LAB VISIT (OUTPATIENT)
Dept: LAB | Facility: HOSPITAL | Age: 50
End: 2020-08-03
Attending: NURSE PRACTITIONER
Payer: COMMERCIAL

## 2020-08-03 ENCOUNTER — OFFICE VISIT (OUTPATIENT)
Dept: DIABETES | Facility: CLINIC | Age: 50
End: 2020-08-03
Payer: COMMERCIAL

## 2020-08-03 VITALS
WEIGHT: 313.69 LBS | TEMPERATURE: 99 F | BODY MASS INDEX: 45.01 KG/M2 | HEART RATE: 82 BPM | SYSTOLIC BLOOD PRESSURE: 136 MMHG | DIASTOLIC BLOOD PRESSURE: 91 MMHG

## 2020-08-03 DIAGNOSIS — E11.69 TYPE 2 DIABETES MELLITUS WITH OTHER SPECIFIED COMPLICATION, UNSPECIFIED WHETHER LONG TERM INSULIN USE: ICD-10-CM

## 2020-08-03 DIAGNOSIS — E11.69 TYPE 2 DIABETES MELLITUS WITH OTHER SPECIFIED COMPLICATION, UNSPECIFIED WHETHER LONG TERM INSULIN USE: Primary | ICD-10-CM

## 2020-08-03 LAB — GLUCOSE SERPL-MCNC: 87 MG/DL (ref 70–110)

## 2020-08-03 PROCEDURE — 99214 OFFICE O/P EST MOD 30 MIN: CPT | Mod: S$GLB,,, | Performed by: NURSE PRACTITIONER

## 2020-08-03 PROCEDURE — 3046F HEMOGLOBIN A1C LEVEL >9.0%: CPT | Mod: CPTII,S$GLB,, | Performed by: NURSE PRACTITIONER

## 2020-08-03 PROCEDURE — 82962 GLUCOSE BLOOD TEST: CPT | Mod: S$GLB,,, | Performed by: NURSE PRACTITIONER

## 2020-08-03 PROCEDURE — 3008F PR BODY MASS INDEX (BMI) DOCUMENTED: ICD-10-PCS | Mod: CPTII,S$GLB,, | Performed by: NURSE PRACTITIONER

## 2020-08-03 PROCEDURE — 83036 HEMOGLOBIN GLYCOSYLATED A1C: CPT

## 2020-08-03 PROCEDURE — 82962 POCT GLUCOSE, HAND-HELD DEVICE: ICD-10-PCS | Mod: S$GLB,,, | Performed by: NURSE PRACTITIONER

## 2020-08-03 PROCEDURE — 3046F PR MOST RECENT HEMOGLOBIN A1C LEVEL > 9.0%: ICD-10-PCS | Mod: CPTII,S$GLB,, | Performed by: NURSE PRACTITIONER

## 2020-08-03 PROCEDURE — 3008F BODY MASS INDEX DOCD: CPT | Mod: CPTII,S$GLB,, | Performed by: NURSE PRACTITIONER

## 2020-08-03 PROCEDURE — 99214 PR OFFICE/OUTPT VISIT, EST, LEVL IV, 30-39 MIN: ICD-10-PCS | Mod: S$GLB,,, | Performed by: NURSE PRACTITIONER

## 2020-08-03 PROCEDURE — 99999 PR PBB SHADOW E&M-EST. PATIENT-LVL III: ICD-10-PCS | Mod: PBBFAC,,, | Performed by: NURSE PRACTITIONER

## 2020-08-03 PROCEDURE — 99999 PR PBB SHADOW E&M-EST. PATIENT-LVL III: CPT | Mod: PBBFAC,,, | Performed by: NURSE PRACTITIONER

## 2020-08-03 PROCEDURE — 36415 COLL VENOUS BLD VENIPUNCTURE: CPT

## 2020-08-03 NOTE — PATIENT INSTRUCTIONS
PATIENT INSTRUCTIONS  - Follow up as scheduled.   - Carb Count: 30-45G per meal and 15G per snack  - Exercise: Goal is 150 minutes or more per week  - Bring meter and blood sugar log to each appointment.     Continue diet and exercise       - Blood Sugar Goals:  1. The goal for fasting blood sugars is 80 -130 mg/dl.   2. The goal for the 2 hour after meal blood sugars is below 180 mg/dl.  3. Blood sugars below 70 are considered LOW and you must eat or drink 15G of carbohydrates immediately, then recheck blood sugar after 15 minutes to ensure blood sugar has returned to normal range, (70 or above)

## 2020-08-04 LAB
ESTIMATED AVG GLUCOSE: 126 MG/DL (ref 68–131)
HBA1C MFR BLD HPLC: 6 % (ref 4–5.6)

## 2021-09-07 ENCOUNTER — OFFICE VISIT (OUTPATIENT)
Dept: OPHTHALMOLOGY | Facility: CLINIC | Age: 51
End: 2021-09-07
Payer: COMMERCIAL

## 2021-09-07 DIAGNOSIS — I10 ESSENTIAL HYPERTENSION: ICD-10-CM

## 2021-09-07 DIAGNOSIS — H52.7 REFRACTIVE ERRORS: ICD-10-CM

## 2021-09-07 DIAGNOSIS — E11.9 DIABETES MELLITUS TYPE 2 WITHOUT RETINOPATHY: Primary | ICD-10-CM

## 2021-09-07 PROCEDURE — 2023F PR DILATED RETINAL EXAM W/O EVID OF RETINOPATHY: ICD-10-PCS | Mod: CPTII,S$GLB,, | Performed by: OPTOMETRIST

## 2021-09-07 PROCEDURE — 92014 COMPRE OPH EXAM EST PT 1/>: CPT | Mod: S$GLB,,, | Performed by: OPTOMETRIST

## 2021-09-07 PROCEDURE — 1159F MED LIST DOCD IN RCRD: CPT | Mod: CPTII,S$GLB,, | Performed by: OPTOMETRIST

## 2021-09-07 PROCEDURE — 92015 PR REFRACTION: ICD-10-PCS | Mod: S$GLB,,, | Performed by: OPTOMETRIST

## 2021-09-07 PROCEDURE — 2023F DILAT RTA XM W/O RTNOPTHY: CPT | Mod: CPTII,S$GLB,, | Performed by: OPTOMETRIST

## 2021-09-07 PROCEDURE — 92015 DETERMINE REFRACTIVE STATE: CPT | Mod: S$GLB,,, | Performed by: OPTOMETRIST

## 2021-09-07 PROCEDURE — 99999 PR PBB SHADOW E&M-EST. PATIENT-LVL II: CPT | Mod: PBBFAC,,, | Performed by: OPTOMETRIST

## 2021-09-07 PROCEDURE — 92014 PR EYE EXAM, EST PATIENT,COMPREHESV: ICD-10-PCS | Mod: S$GLB,,, | Performed by: OPTOMETRIST

## 2021-09-07 PROCEDURE — 99999 PR PBB SHADOW E&M-EST. PATIENT-LVL II: ICD-10-PCS | Mod: PBBFAC,,, | Performed by: OPTOMETRIST

## 2021-09-07 PROCEDURE — 1159F PR MEDICATION LIST DOCUMENTED IN MEDICAL RECORD: ICD-10-PCS | Mod: CPTII,S$GLB,, | Performed by: OPTOMETRIST

## 2021-09-07 RX ORDER — ASPIRIN 325 MG
50000 TABLET, DELAYED RELEASE (ENTERIC COATED) ORAL 2 TIMES DAILY
COMMUNITY
Start: 2021-08-16